# Patient Record
Sex: FEMALE | Race: WHITE | NOT HISPANIC OR LATINO | Employment: FULL TIME | URBAN - METROPOLITAN AREA
[De-identification: names, ages, dates, MRNs, and addresses within clinical notes are randomized per-mention and may not be internally consistent; named-entity substitution may affect disease eponyms.]

---

## 2017-11-02 ENCOUNTER — GENERIC CONVERSION - ENCOUNTER (OUTPATIENT)
Dept: FAMILY MEDICINE CLINIC | Facility: CLINIC | Age: 52
End: 2017-11-02

## 2017-11-02 ENCOUNTER — GENERIC CONVERSION - ENCOUNTER (OUTPATIENT)
Dept: OTHER | Facility: OTHER | Age: 52
End: 2017-11-02

## 2017-11-02 DIAGNOSIS — Z12.31 ENCOUNTER FOR SCREENING MAMMOGRAM FOR MALIGNANT NEOPLASM OF BREAST: ICD-10-CM

## 2017-11-02 DIAGNOSIS — E03.9 HYPOTHYROIDISM: ICD-10-CM

## 2017-11-02 DIAGNOSIS — R03.0 ELEVATED BLOOD PRESSURE READING WITHOUT DIAGNOSIS OF HYPERTENSION: ICD-10-CM

## 2018-01-22 VITALS
OXYGEN SATURATION: 99 % | TEMPERATURE: 97.7 F | WEIGHT: 253.5 LBS | HEIGHT: 68 IN | DIASTOLIC BLOOD PRESSURE: 90 MMHG | HEART RATE: 82 BPM | BODY MASS INDEX: 38.42 KG/M2 | SYSTOLIC BLOOD PRESSURE: 138 MMHG

## 2018-01-22 VITALS — SYSTOLIC BLOOD PRESSURE: 118 MMHG | DIASTOLIC BLOOD PRESSURE: 88 MMHG

## 2018-04-19 DIAGNOSIS — E03.9 HYPOTHYROIDISM, UNSPECIFIED TYPE: Primary | ICD-10-CM

## 2018-04-19 RX ORDER — LEVOTHYROXINE SODIUM 0.1 MG/1
TABLET ORAL
Qty: 90 TABLET | Refills: 1 | Status: SHIPPED | OUTPATIENT
Start: 2018-04-19 | End: 2018-10-16 | Stop reason: SDUPTHER

## 2018-04-21 LAB
ALBUMIN SERPL BCP-MCNC: 4.2 G/DL (ref 3.5–5.7)
ALP SERPL-CCNC: 44 IU/L (ref 40–150)
ALT SERPL W P-5'-P-CCNC: 22 IU/L (ref 0–50)
ANION GAP SERPL CALCULATED.3IONS-SCNC: 8.9 MM/L
AST SERPL W P-5'-P-CCNC: 18 U/L (ref 7–26)
BILIRUB SERPL-MCNC: 0.7 MG/DL (ref 0.3–1)
BUN SERPL-MCNC: 13 MG/DL (ref 7–25)
CALCIUM SERPL-MCNC: 9.2 MG/DL (ref 8.6–10.5)
CHLORIDE SERPL-SCNC: 102 MM/L (ref 98–107)
CO2 SERPL-SCNC: 30 MM/L (ref 21–31)
CREAT SERPL-MCNC: 0.8 MG/DL (ref 0.6–1.2)
EGFR (HISTORICAL): > 60 GFR
EGFR AFRICAN AMERICAN (HISTORICAL): > 60 GFR
GLUCOSE (HISTORICAL): 94 MG/DL (ref 65–99)
OSMOLALITY, SERUM (HISTORICAL): 274 MOSM (ref 262–291)
POTASSIUM SERPL-SCNC: 3.9 MM/L (ref 3.5–5.5)
SODIUM SERPL-SCNC: 137 MM/L (ref 134–143)
TOTAL PROTEIN (HISTORICAL): 6.8 G/DL (ref 6.4–8.9)
TSH SERPL DL<=0.05 MIU/L-ACNC: 2.93 UIU/M (ref 0.45–5.33)

## 2018-05-21 RX ORDER — ACETAMINOPHEN 160 MG
1 TABLET,DISINTEGRATING ORAL DAILY
COMMUNITY
Start: 2017-11-02

## 2018-06-15 ENCOUNTER — OFFICE VISIT (OUTPATIENT)
Dept: FAMILY MEDICINE CLINIC | Facility: CLINIC | Age: 53
End: 2018-06-15
Payer: COMMERCIAL

## 2018-06-15 VITALS
TEMPERATURE: 98.6 F | DIASTOLIC BLOOD PRESSURE: 80 MMHG | RESPIRATION RATE: 16 BRPM | HEIGHT: 68 IN | SYSTOLIC BLOOD PRESSURE: 118 MMHG | WEIGHT: 259 LBS | BODY MASS INDEX: 39.25 KG/M2 | HEART RATE: 75 BPM | OXYGEN SATURATION: 98 %

## 2018-06-15 DIAGNOSIS — Z12.11 SCREEN FOR COLON CANCER: Primary | ICD-10-CM

## 2018-06-15 DIAGNOSIS — E03.9 HYPOTHYROIDISM, UNSPECIFIED TYPE: ICD-10-CM

## 2018-06-15 DIAGNOSIS — Z12.39 SCREENING FOR BREAST CANCER: ICD-10-CM

## 2018-06-15 PROBLEM — R03.0 BLOOD PRESSURE ELEVATED WITHOUT HISTORY OF HTN: Status: ACTIVE | Noted: 2017-11-02

## 2018-06-15 PROBLEM — E55.9 VITAMIN D DEFICIENCY: Status: ACTIVE | Noted: 2017-11-02

## 2018-06-15 PROCEDURE — 3008F BODY MASS INDEX DOCD: CPT | Performed by: PHYSICIAN ASSISTANT

## 2018-06-15 PROCEDURE — 99214 OFFICE O/P EST MOD 30 MIN: CPT | Performed by: PHYSICIAN ASSISTANT

## 2018-06-15 RX ORDER — DIPHENOXYLATE HYDROCHLORIDE AND ATROPINE SULFATE 2.5; .025 MG/1; MG/1
1 TABLET ORAL DAILY
COMMUNITY

## 2018-06-15 NOTE — PROGRESS NOTES
Routine Follow-up    Harolyn Schilder 46 y o  female   Date:  6/15/2018      Assessment and Plan:    Heena Jaramillo was seen today for follow-up  Diagnoses and all orders for this visit:    Screen for colon cancer  - patient wishes to go where her  will go, does not need referral per patient     Screening for breast cancer  -     Mammo screening bilateral w cad; Future    Hypothyroidism, unspecified type  -     TSH, 3rd generation; Future  -     T4, free; Future  - defers endo referral  - repeat every 6 month thyroid labs  - sensitivity to cold - thyroid labs normal, no change in dose, can happen with weight loss      HPI:  Chief Complaint   Patient presents with    Follow-up     HPI  Patient is a 47 yo female with PMH below who presents for routine 6 month follow up  Her thyroid labs were stable in April and she continues synthroid daily, taking correctly  However, she states that her sensitivity to cold is getting worse  She has lost 5-10 lbs  She feels her skin is dry so uses serum on her face which helps  She was asking for recommendations  She did not schedule mammogram or colonoscopy from last visit  She does not want a colonoscopy script, will go where her  will go and say she does not need a referral  She did not establish with a new GYN  Her BP is normal this visit  She does drink daily coffee, 1 travel mug  She has no other concerns  ROS: Review of Systems   Constitutional: Negative for chills, fatigue, fever and unexpected weight change  HENT: Negative  Respiratory: Negative  Cardiovascular: Negative  Gastrointestinal: Negative  Endocrine: Positive for cold intolerance  Negative for heat intolerance  Genitourinary: Negative for difficulty urinating, dysuria and hematuria  Skin: Negative for color change, rash and wound  Dry skin    Neurological: Negative for dizziness, seizures, syncope, weakness, light-headedness and headaches  Hematological: Negative for adenopathy  Does not bruise/bleed easily  Psychiatric/Behavioral: Negative for confusion and sleep disturbance  The patient is not nervous/anxious  Past Medical History:   Diagnosis Date    Thyroid disorder      Patient Active Problem List   Diagnosis    Asthma    Blood pressure elevated without history of HTN    Epilepsy ([facility] [address]    Hyperlipidemia    Hypothyroidism    Vitamin D deficiency    Vertigo       Past Surgical History:   Procedure Laterality Date     SECTION         Social History     Social History    Marital status: /Civil Union     Spouse name: N/A    Number of children: N/A    Years of education: N/A     Social History Main Topics    Smoking status: Never Smoker    Smokeless tobacco: Never Used    Alcohol use No    Drug use: No    Sexual activity: Not Asked     Other Topics Concern    None     Social History Narrative    Always uses seat belt    Caffeine use, 2 cups of coffee per day           Family History   Problem Relation Age of Onset    Stroke Mother         cerebrovascular accident (CVA)    Other Mother         Epilepsy    Hypertension Father     Basal cell carcinoma Father     Heart attack Father         myocardial infarction    Other Other         epilepsy    Other Other         epilepsy       Allergies   Allergen Reactions    Monosodium Glutamate          Current Outpatient Prescriptions:     Cholecalciferol (VITAMIN D3) 2000 units capsule, Take 1 tablet by mouth daily, Disp: , Rfl:     levothyroxine 100 mcg tablet, TAKE 1 TABLET DAILY, Disp: 90 tablet, Rfl: 1    multivitamin (THERAGRAN) TABS, Take 1 tablet by mouth daily, Disp: , Rfl:       Physical Exam:  /80 (BP Location: Left arm, Patient Position: Sitting, Cuff Size: Adult)   Pulse 75   Temp 98 6 °F (37 °C)   Resp 16   Ht 5' 8" (1 727 m)   Wt 117 kg (259 lb)   SpO2 98%   BMI 39 38 kg/m²     Physical Exam   Constitutional: She is oriented to person, place, and time   She appears well-developed and well-nourished  No distress  Overweight     HENT:   Head: Normocephalic and atraumatic  Right Ear: External ear normal    Left Ear: External ear normal    Nose: Nose normal    Mouth/Throat: Oropharynx is clear and moist  No oropharyngeal exudate  Eyes: Conjunctivae are normal  Pupils are equal, round, and reactive to light  Wears glasses   Neck: Normal range of motion  Neck supple  No thyromegaly present  Cardiovascular: Normal rate, regular rhythm, normal heart sounds and intact distal pulses  Pulmonary/Chest: Effort normal and breath sounds normal  No respiratory distress  Musculoskeletal: Normal range of motion  She exhibits no deformity  Lymphadenopathy:     She has no cervical adenopathy  Neurological: She is alert and oriented to person, place, and time  No cranial nerve deficit  Skin: Skin is warm and dry  No rash noted  No erythema  No pallor  Skin soft on face (uses daily serums)      Psychiatric: She has a normal mood and affect   Her behavior is normal          Labs:  No results found for: WBC, HGB, HCT, MCV, PLT  No results found for: NA, K, CL, CO2, ANIONGAP, BUN, CREATININE, GLUCOSE, GLUF, CALCIUM, CORRECTEDCA, AST, ALT, ALKPHOS, PROT, ALBUMIN, BILITOT, EGFR

## 2018-06-29 ENCOUNTER — TELEPHONE (OUTPATIENT)
Dept: FAMILY MEDICINE CLINIC | Facility: CLINIC | Age: 53
End: 2018-06-29

## 2018-06-29 NOTE — TELEPHONE ENCOUNTER
Pt called and lm on vm stating she is unsure if the new script went over to her mail order when she was here in June  PT did not leave medication name  If nothing was sent please send to mail order

## 2018-06-29 NOTE — TELEPHONE ENCOUNTER
We would need name of medication  Her synthroid was sent for 6 month supply in April so should be covered until October

## 2018-10-15 ENCOUNTER — OFFICE VISIT (OUTPATIENT)
Dept: FAMILY MEDICINE CLINIC | Facility: CLINIC | Age: 53
End: 2018-10-15
Payer: COMMERCIAL

## 2018-10-15 VITALS
SYSTOLIC BLOOD PRESSURE: 140 MMHG | OXYGEN SATURATION: 98 % | HEART RATE: 98 BPM | TEMPERATURE: 98 F | DIASTOLIC BLOOD PRESSURE: 98 MMHG | WEIGHT: 260.2 LBS | HEIGHT: 70 IN | BODY MASS INDEX: 37.25 KG/M2

## 2018-10-15 DIAGNOSIS — J30.1 SEASONAL ALLERGIC RHINITIS DUE TO POLLEN: ICD-10-CM

## 2018-10-15 DIAGNOSIS — J45.21 MILD INTERMITTENT ASTHMA WITH ACUTE EXACERBATION: Primary | ICD-10-CM

## 2018-10-15 PROCEDURE — 3008F BODY MASS INDEX DOCD: CPT | Performed by: FAMILY MEDICINE

## 2018-10-15 PROCEDURE — 99213 OFFICE O/P EST LOW 20 MIN: CPT | Performed by: FAMILY MEDICINE

## 2018-10-15 PROCEDURE — 1036F TOBACCO NON-USER: CPT | Performed by: FAMILY MEDICINE

## 2018-10-15 RX ORDER — ALBUTEROL SULFATE 90 UG/1
2 AEROSOL, METERED RESPIRATORY (INHALATION) EVERY 6 HOURS PRN
Qty: 1 INHALER | Refills: 1 | Status: SHIPPED | OUTPATIENT
Start: 2018-10-15 | End: 2020-01-31 | Stop reason: SDUPTHER

## 2018-10-15 NOTE — PROGRESS NOTES
Assessment/Plan:       Diagnoses and all orders for this visit:    Mild intermittent asthma with acute exacerbation  -     albuterol (VENTOLIN HFA) 90 mcg/act inhaler; Inhale 2 puffs every 6 (six) hours as needed for wheezing    Seasonal allergic rhinitis due to pollen          Subjective:   Chief Complaint   Patient presents with    Asthma     asthma attack last night, due for colonoscopy has never had one        Patient ID: Jolanta Pierre is a 46 y o  female  Per c/c reviewed by me  C/o "Stars must have aligned and I had an asthma attack, haven't had one in years, years, didn't have any inhalers -haven't had one in years, Friday night went to bed tight, next day released a little, but last night wouldn't"  Tried allegra-d and breathing in steam  "Do have allergies, usually late august, and did feel better after got out of my house"  Works in a school, 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, no recent travel        The following portions of the patient's history were reviewed and updated as appropriate: allergies, current medications, past family history, past medical history, past social history, past surgical history and problem list     Review of Systems   Constitutional: Negative  HENT: Positive for postnasal drip, rhinorrhea and sneezing  Negative for congestion, facial swelling, hearing loss, mouth sores, nosebleeds, sinus pain, sinus pressure, sore throat and trouble swallowing  Eyes: Negative  Respiratory: Positive for cough, chest tightness, shortness of breath and wheezing  Negative for apnea, choking and stridor  Cardiovascular: Negative  Gastrointestinal: Negative  Skin: Negative  Neurological: Negative  Hematological: Negative            Objective:      /98 (BP Location: Left arm, Patient Position: Sitting, Cuff Size: Standard)   Pulse 98   Temp 98 °F (36 7 °C) (Tympanic)   Ht 5' 9 5" (1 765 m)   Wt 118 kg (260 lb 3 2 oz)   LMP 09/01/2018 (Approximate)   SpO2 98%   BMI 37 87 kg/m²          Physical Exam   Constitutional: She is oriented to person, place, and time  She appears well-developed  Non-toxic appearance  She does not have a sickly appearance  She does not appear ill  No distress  HENT:   Head: Normocephalic and atraumatic  Right Ear: Tympanic membrane, external ear and ear canal normal    Left Ear: Tympanic membrane, external ear and ear canal normal    Nose: Mucosal edema and rhinorrhea present  No sinus tenderness  Right sinus exhibits no maxillary sinus tenderness and no frontal sinus tenderness  Left sinus exhibits no maxillary sinus tenderness and no frontal sinus tenderness  Mouth/Throat: Uvula is midline, oropharynx is clear and moist and mucous membranes are normal    Eyes: Pupils are equal, round, and reactive to light  Conjunctivae and lids are normal    Cardiovascular: Normal rate, regular rhythm and normal heart sounds  Pulmonary/Chest: Effort normal  She has decreased breath sounds in the right lower field and the left lower field  She has wheezes  She has no rhonchi  She has no rales  Lymphadenopathy:     She has no cervical adenopathy  Right: No supraclavicular adenopathy present  Left: No supraclavicular adenopathy present  Neurological: She is alert and oriented to person, place, and time  Skin: Skin is warm and dry  She is not diaphoretic  No cyanosis  No pallor  Nails show no clubbing  Psychiatric: She has a normal mood and affect  Her behavior is normal    Nursing note and vitals reviewed

## 2018-10-16 DIAGNOSIS — E03.9 HYPOTHYROIDISM, UNSPECIFIED TYPE: ICD-10-CM

## 2018-10-16 RX ORDER — LEVOTHYROXINE SODIUM 0.1 MG/1
TABLET ORAL
Qty: 90 TABLET | Refills: 1 | Status: SHIPPED | OUTPATIENT
Start: 2018-10-16 | End: 2019-04-14 | Stop reason: SDUPTHER

## 2019-01-30 ENCOUNTER — OFFICE VISIT (OUTPATIENT)
Dept: FAMILY MEDICINE CLINIC | Facility: CLINIC | Age: 54
End: 2019-01-30
Payer: COMMERCIAL

## 2019-01-30 VITALS
DIASTOLIC BLOOD PRESSURE: 80 MMHG | SYSTOLIC BLOOD PRESSURE: 124 MMHG | WEIGHT: 262 LBS | BODY MASS INDEX: 38.14 KG/M2 | HEART RATE: 81 BPM | OXYGEN SATURATION: 97 % | TEMPERATURE: 98.1 F | RESPIRATION RATE: 16 BRPM

## 2019-01-30 DIAGNOSIS — Z12.39 BREAST CANCER SCREENING: ICD-10-CM

## 2019-01-30 DIAGNOSIS — Z01.419 ENCOUNTER FOR GYNECOLOGICAL EXAMINATION WITHOUT ABNORMAL FINDING: ICD-10-CM

## 2019-01-30 DIAGNOSIS — L72.3 SEBACEOUS CYST OF LEFT AXILLA: Primary | ICD-10-CM

## 2019-01-30 PROCEDURE — 99213 OFFICE O/P EST LOW 20 MIN: CPT | Performed by: PHYSICIAN ASSISTANT

## 2019-01-30 RX ORDER — SODIUM PICOSULFATE, MAGNESIUM OXIDE, AND ANHYDROUS CITRIC ACID 10; 3.5; 12 MG/160ML; G/160ML; G/160ML
LIQUID ORAL
Refills: 0 | COMMUNITY
Start: 2019-01-10 | End: 2019-07-09

## 2019-01-30 NOTE — PROGRESS NOTES
Routine Follow-up     48 y o  female   Date:  2019      Assessment and Plan:    Jeri Frazier was seen today for mass and follow-up  Diagnoses and all orders for this visit:    Sebaceous cyst of left axilla  -     US extremity soft tissue; Future  -     Ambulatory referral to General Surgery; Future    Encounter for gynecological examination without abnormal finding  -     Ambulatory referral to Gynecology; Future    Breast cancer screening  -     Mammo screening bilateral w cad; Future              HPI:  Chief Complaint   Patient presents with    Mass     under left arm- had for several year- had checked out at first- was told do not do anything till bothered- now bothering her    Follow-up     need awilda done- had rx- getting CRC in [place]      HPI  Patient is a 47 yo female who presents with concern of bothersome cyst of L axilla  She recalls being told she had a sebaceous cyst about 5 years ago and since was not bothering her at that time, no follow up  She reports feeling like it has grown and just feels like a bothersome golfball in axilla when moves arm  No cellulitis or drainage  She does not recall any imaging  She also need new script for mammogram  She wants to establish with a new GNY and will be going for colonoscopy next month  ROS: Review of Systems   Constitutional: Negative  Respiratory: Negative  Cardiovascular: Negative  Skin: Negative for color change, rash and wound  Lump L armpit     Neurological: Negative  Hematological: Negative          Past Medical History:   Diagnosis Date    Thyroid disorder      Patient Active Problem List   Diagnosis    Asthma    Blood pressure elevated without history of HTN    Epilepsy ([facility] [address]    Hyperlipidemia    Hypothyroidism    Vitamin D deficiency    Vertigo    Seasonal allergic rhinitis due to pollen       Past Surgical History:   Procedure Laterality Date     SECTION         Social History     Social History  Marital status: /Civil Union     Spouse name: N/A    Number of children: N/A    Years of education: N/A     Social History Main Topics    Smoking status: Never Smoker    Smokeless tobacco: Never Used    Alcohol use No    Drug use: No    Sexual activity: Not on file     Other Topics Concern    Not on file     Social History Narrative    Always uses seat belt    Caffeine use, 2 cups of coffee per day           Family History   Problem Relation Age of Onset    Stroke Mother         cerebrovascular accident (CVA)    Other Mother         Epilepsy    Hypertension Father     Basal cell carcinoma Father     Heart attack Father         myocardial infarction    Other Other         epilepsy    Other Other         epilepsy       Allergies   Allergen Reactions    Monosodium Glutamate          Current Outpatient Prescriptions:     albuterol (VENTOLIN HFA) 90 mcg/act inhaler, Inhale 2 puffs every 6 (six) hours as needed for wheezing, Disp: 1 Inhaler, Rfl: 1    Cholecalciferol (VITAMIN D3) 2000 units capsule, Take 1 tablet by mouth daily, Disp: , Rfl:     levothyroxine 100 mcg tablet, TAKE 1 TABLET DAILY, Disp: 90 tablet, Rfl: 1    multivitamin (THERAGRAN) TABS, Take 1 tablet by mouth daily, Disp: , Rfl:     CLENPIQ 10-3 5-12 MG-GM -GM/160ML SOLN, , Disp: , Rfl: 0      Physical Exam:  /80   Pulse 81   Temp 98 1 °F (36 7 °C)   Resp 16   Wt 119 kg (262 lb)   SpO2 97%   BMI 38 14 kg/m²     Physical Exam   Constitutional: She is oriented to person, place, and time  She appears well-developed and well-nourished  No distress  obese   Neurological: She is alert and oriented to person, place, and time  Skin: Skin is dry  No rash noted  Appreciable lump in L axila - cyst vs lymph node without any surrounding skin changes    Psychiatric: She has a normal mood and affect   Her behavior is normal          Labs:  No results found for: WBC, HGB, HCT, MCV, PLT  Lab Results   Component Value Date  04/21/2018    K 3 9 04/21/2018     04/21/2018    CO2 30 04/21/2018    ANIONGAP 8 9 04/21/2018    BUN 13 04/21/2018    CREATININE 0 80 04/21/2018    CALCIUM 9 2 04/21/2018    AST 18 04/21/2018    ALT 22 04/21/2018    ALKPHOS 44 04/21/2018    PROT 6 8 04/21/2018    BILITOT 0 7 04/21/2018

## 2019-02-06 ENCOUNTER — HOSPITAL ENCOUNTER (OUTPATIENT)
Dept: ULTRASOUND IMAGING | Facility: HOSPITAL | Age: 54
Discharge: HOME | End: 2019-02-06
Payer: COMMERCIAL

## 2019-02-06 DIAGNOSIS — L72.3 SEBACEOUS CYST OF LEFT AXILLA: ICD-10-CM

## 2019-02-06 PROCEDURE — 76882 US LMTD JT/FCL EVL NVASC XTR: CPT

## 2019-02-07 ENCOUNTER — OFFICE VISIT (OUTPATIENT)
Dept: SURGERY | Facility: CLINIC | Age: 54
End: 2019-02-07
Payer: COMMERCIAL

## 2019-02-07 VITALS
WEIGHT: 262 LBS | TEMPERATURE: 97.7 F | SYSTOLIC BLOOD PRESSURE: 122 MMHG | DIASTOLIC BLOOD PRESSURE: 76 MMHG | HEIGHT: 70 IN | BODY MASS INDEX: 37.51 KG/M2 | HEART RATE: 70 BPM

## 2019-02-07 DIAGNOSIS — L72.3 SEBACEOUS CYST OF LEFT AXILLA: ICD-10-CM

## 2019-02-07 PROCEDURE — 99243 OFF/OP CNSLTJ NEW/EST LOW 30: CPT | Performed by: PHYSICIAN ASSISTANT

## 2019-02-07 NOTE — PROGRESS NOTES
Assessment/Plan:   Humberto White is a 48 y  o female who is here for The encounter diagnosis was Sebaceous cyst of left axilla  Patient states she has had a mass of left axilla for years that would wax and wane in size and tenderness  She was told it was a sebaceous cyst   Patient had an axillary US that showed no abnormality at area of concern    On exam found to have axillary fullness , no discrete mass noted, no skin changes  Minor tenderness of axilla        Plan: Continue to observe with recommendation for follow-up if area changes           _______________________________________________________  CC:Consult (Left axilla- suspected chuck cyst)    HPI:  Humberto White is a 48 y  o female who was referred for evaluation of Consult (Left axilla- suspected seb cyst)    Currently patient reports painful mass of left axilla  Patient states she has had a mass of left axilla for years that would wax and wane in size and tenderness  She was told it was a sebaceous cyst   Patient had an axillary US that showed no abnormality at area of concern  Reports: growing and painful    ROS:  General ROS: negative  negative for - chills, fatigue, fever or night sweats, weight loss  Respiratory ROS: no cough, shortness of breath, or wheezing  Cardiovascular ROS: no chest pain or dyspnea on exertion  Genito-Urinary ROS: no dysuria, trouble voiding, or hematuria  Musculoskeletal ROS: negative for - gait disturbance, joint pain or muscle pain  Neurological ROS: no TIA or stroke symptoms  Skin ROS: See HPI  GI ROS: see HPI  Skin ROS: no new rashes or lesions   Lymphatic ROS: no new adenopathy noted by pt     GYN ROS: see HPI, no new GYN history or bleeding noted  Psy ROS: no new mental or behavioral disturbances       Patient Active Problem List   Diagnosis    Asthma    Blood pressure elevated without history of HTN    Epilepsy ([facility] [address]    Hyperlipidemia    Hypothyroidism    Vitamin D deficiency    Vertigo    Seasonal allergic rhinitis due to pollen         Allergies:  Monosodium glutamate      Current Outpatient Prescriptions:     albuterol (VENTOLIN HFA) 90 mcg/act inhaler, Inhale 2 puffs every 6 (six) hours as needed for wheezing, Disp: 1 Inhaler, Rfl: 1    Cholecalciferol (VITAMIN D3) 2000 units capsule, Take 1 tablet by mouth daily, Disp: , Rfl:     CLENPIQ 10-3 5-12 MG-GM -GM/160ML SOLN, , Disp: , Rfl: 0    levothyroxine 100 mcg tablet, TAKE 1 TABLET DAILY, Disp: 90 tablet, Rfl: 1    multivitamin (THERAGRAN) TABS, Take 1 tablet by mouth daily, Disp: , Rfl:     Past Medical History:   Diagnosis Date    Thyroid disorder        Past Surgical History:   Procedure Laterality Date     SECTION         Family History   Problem Relation Age of Onset    Stroke Mother         cerebrovascular accident (CVA)    Other Mother         Epilepsy    Hypertension Father     Basal cell carcinoma Father     Heart attack Father         myocardial infarction    Other Other         epilepsy    Other Other         epilepsy        reports that she has never smoked  She has never used smokeless tobacco  She reports that she does not drink alcohol or use drugs  PHYSICAL EXAM  General Appearance:    Alert, cooperative, no distress   Head:    Normocephalic without obvious abnormality   Eyes:    PERRL, conjunctiva/corneas clear, EOM's intact        Neck:   Supple, no adenopathy, no JVD   Back:     Symmetric, no spinal or CVA tenderness   Lungs:     Clear to auscultation bilaterally, no wheezing or rhonchi   Heart:    Regular rate and rhythm, S1 and S2 normal, no murmur   Abdomen:     Benign, no rebound or guarding  Extremities:   Extremities normal  No clubbing, cyanosis or edema   Psych:   Normal Affect, AOx3  Neurologic:  Skin:   CNII-XII intact   Strength symmetric, speech intact    Warm, dry, intact, no visible rashes or lesions except as follows: area of tenderness but no skin changes , no discrete mass palpable Some portions of this record may have been generated with voice recognition software  There may be translation, syntax,  or grammatical errors  Occasional wrong word or "sound-a-like" substitutions may have occurred due to the inherent limitations of the voice recognition software  Read the chart carefully and recognize, using context, where substitutions may have occurred  If you have any questions, please contact the dictating provider for clarification or correction, as needed  This encounter has been coded by a non-certified coder         Mare Brittle, MD    Date: 2/7/2019 Time: 3:49 PM

## 2019-02-07 NOTE — LETTER
February 7, 2019     Jacquelyn Castanon PA-C  [address]  [facility]  [phone] [id number]    Patient: Heather Oliveros   YOB: 1965   Date of Visit: 2/7/2019       Dear Dr Dorrie Gilford: Thank you for referring Ignacio Faye to me for evaluation  Below are my notes for this consultation  If you have questions, please do not hesitate to call me  I look forward to following your patient along with you  Sincerely,        Tonia Magallon MD        CC: No Recipients  Angel Pablo  2/7/2019  3:53 PM  Sign at close encounter  Assessment/Plan:   Heather Oliveros is a 48 y  o female who is here for The encounter diagnosis was Sebaceous cyst of left axilla  Patient states she has had a mass of left axilla for years that would wax and wane in size and tenderness  She was told it was a sebaceous cyst   Patient had an axillary US that showed no abnormality at area of concern    On exam found to have axillary fullness , no discrete mass noted, no skin changes  Minor tenderness of axilla        Plan: Continue to observe with recommendation for follow-up if area changes           _______________________________________________________  CC:Consult (Left axilla- suspected seb cyst)    HPI:  Heather Oliveros is a 48 y  o female who was referred for evaluation of Consult (Left axilla- suspected seb cyst)    Currently patient reports painful mass of left axilla  Patient states she has had a mass of left axilla for years that would wax and wane in size and tenderness   She was told it was a sebaceous cyst   Patient had an axillary US that showed no abnormality at area of concern  Reports: growing and painful    ROS:  General ROS: negative  negative for - chills, fatigue, fever or night sweats, weight loss  Respiratory ROS: no cough, shortness of breath, or wheezing  Cardiovascular ROS: no chest pain or dyspnea on exertion  Genito-Urinary ROS: no dysuria, trouble voiding, or hematuria  Musculoskeletal ROS: negative for - gait disturbance, joint pain or muscle pain  Neurological ROS: no TIA or stroke symptoms  Skin ROS: See HPI  GI ROS: see HPI  Skin ROS: no new rashes or lesions   Lymphatic ROS: no new adenopathy noted by pt  GYN ROS: see HPI, no new GYN history or bleeding noted  Psy ROS: no new mental or behavioral disturbances       Patient Active Problem List   Diagnosis    Asthma    Blood pressure elevated without history of HTN    Epilepsy ([facility] [address]    Hyperlipidemia    Hypothyroidism    Vitamin D deficiency    Vertigo    Seasonal allergic rhinitis due to pollen         Allergies:  Monosodium glutamate      Current Outpatient Prescriptions:     albuterol (VENTOLIN HFA) 90 mcg/act inhaler, Inhale 2 puffs every 6 (six) hours as needed for wheezing, Disp: 1 Inhaler, Rfl: 1    Cholecalciferol (VITAMIN D3) 2000 units capsule, Take 1 tablet by mouth daily, Disp: , Rfl:     CLENPIQ 10-3 5-12 MG-GM -GM/160ML SOLN, , Disp: , Rfl: 0    levothyroxine 100 mcg tablet, TAKE 1 TABLET DAILY, Disp: 90 tablet, Rfl: 1    multivitamin (THERAGRAN) TABS, Take 1 tablet by mouth daily, Disp: , Rfl:     Past Medical History:   Diagnosis Date    Thyroid disorder        Past Surgical History:   Procedure Laterality Date     SECTION         Family History   Problem Relation Age of Onset    Stroke Mother         cerebrovascular accident (CVA)    Other Mother         Epilepsy    Hypertension Father     Basal cell carcinoma Father     Heart attack Father         myocardial infarction    Other Other         epilepsy    Other Other         epilepsy        reports that she has never smoked  She has never used smokeless tobacco  She reports that she does not drink alcohol or use drugs      PHYSICAL EXAM  General Appearance:    Alert, cooperative, no distress   Head:    Normocephalic without obvious abnormality   Eyes:    PERRL, conjunctiva/corneas clear, EOM's intact        Neck: Supple, no adenopathy, no JVD   Back:     Symmetric, no spinal or CVA tenderness   Lungs:     Clear to auscultation bilaterally, no wheezing or rhonchi   Heart:    Regular rate and rhythm, S1 and S2 normal, no murmur   Abdomen:     Benign, no rebound or guarding  Extremities:   Extremities normal  No clubbing, cyanosis or edema   Psych:   Normal Affect, AOx3  Neurologic:  Skin:   CNII-XII intact  Strength symmetric, speech intact    Warm, dry, intact, no visible rashes or lesions except as follows: area of tenderness but no skin changes , no discrete mass palpable               Some portions of this record may have been generated with voice recognition software  There may be translation, syntax,  or grammatical errors  Occasional wrong word or "sound-a-like" substitutions may have occurred due to the inherent limitations of the voice recognition software  Read the chart carefully and recognize, using context, where substitutions may have occurred  If you have any questions, please contact the dictating provider for clarification or correction, as needed  This encounter has been coded by a non-certified coder         Estefanía Deleon MD    Date: 2/7/2019 Time: 3:49 PM

## 2019-03-02 ENCOUNTER — HOSPITAL ENCOUNTER (OUTPATIENT)
Dept: MAMMOGRAPHY | Facility: HOSPITAL | Age: 54
Discharge: HOME | End: 2019-03-02
Payer: COMMERCIAL

## 2019-03-02 DIAGNOSIS — Z12.39 BREAST CANCER SCREENING: ICD-10-CM

## 2019-03-02 PROCEDURE — 77067 SCR MAMMO BI INCL CAD: CPT

## 2019-03-02 PROCEDURE — 77063 BREAST TOMOSYNTHESIS BI: CPT

## 2019-04-14 DIAGNOSIS — E03.9 HYPOTHYROIDISM, UNSPECIFIED TYPE: ICD-10-CM

## 2019-04-14 RX ORDER — LEVOTHYROXINE SODIUM 0.1 MG/1
TABLET ORAL
Qty: 90 TABLET | Refills: 0 | Status: SHIPPED | OUTPATIENT
Start: 2019-04-14 | End: 2019-07-09 | Stop reason: SDUPTHER

## 2019-06-28 ENCOUNTER — TELEPHONE (OUTPATIENT)
Dept: FAMILY MEDICINE CLINIC | Facility: CLINIC | Age: 54
End: 2019-06-28

## 2019-07-05 ENCOUNTER — TELEPHONE (OUTPATIENT)
Dept: FAMILY MEDICINE CLINIC | Facility: CLINIC | Age: 54
End: 2019-07-05

## 2019-07-05 NOTE — TELEPHONE ENCOUNTER
Left message for patient that she missed her appointment today 07/05/2019   I made her aware that this is her second no show and to call back to reschedule

## 2019-07-09 ENCOUNTER — OFFICE VISIT (OUTPATIENT)
Dept: FAMILY MEDICINE CLINIC | Facility: CLINIC | Age: 54
End: 2019-07-09
Payer: COMMERCIAL

## 2019-07-09 VITALS
OXYGEN SATURATION: 97 % | WEIGHT: 262 LBS | RESPIRATION RATE: 17 BRPM | BODY MASS INDEX: 38.8 KG/M2 | TEMPERATURE: 97.7 F | HEIGHT: 69 IN | DIASTOLIC BLOOD PRESSURE: 84 MMHG | SYSTOLIC BLOOD PRESSURE: 130 MMHG | HEART RATE: 81 BPM

## 2019-07-09 DIAGNOSIS — E55.9 VITAMIN D DEFICIENCY: Primary | ICD-10-CM

## 2019-07-09 DIAGNOSIS — E03.9 HYPOTHYROIDISM, UNSPECIFIED TYPE: ICD-10-CM

## 2019-07-09 DIAGNOSIS — Z13.220 LIPID SCREENING: ICD-10-CM

## 2019-07-09 PROCEDURE — 1036F TOBACCO NON-USER: CPT | Performed by: PHYSICIAN ASSISTANT

## 2019-07-09 PROCEDURE — 99214 OFFICE O/P EST MOD 30 MIN: CPT | Performed by: PHYSICIAN ASSISTANT

## 2019-07-09 PROCEDURE — 3008F BODY MASS INDEX DOCD: CPT | Performed by: PHYSICIAN ASSISTANT

## 2019-07-09 RX ORDER — LEVOTHYROXINE SODIUM 0.1 MG/1
100 TABLET ORAL DAILY
Qty: 90 TABLET | Refills: 1 | Status: SHIPPED | OUTPATIENT
Start: 2019-07-09 | End: 2020-01-09

## 2019-07-09 NOTE — PROGRESS NOTES
Routine Follow-up    Alfredito Lockett 48 y o  female   Date:  7/9/2019      Assessment and Plan:    Corina Pinzon was seen today for follow-up  Diagnoses and all orders for this visit:    Vitamin D deficiency  -     Vitamin D 25 hydroxy; Future    Hypothyroidism, unspecified type  -     levothyroxine 100 mcg tablet; Take 1 tablet (100 mcg total) by mouth daily  -     TSH, 3rd generation; Future  -     T4, free; Future    BMI 38 0-38 9,adult  -     Comprehensive metabolic panel; Future  -     Hemoglobin A1C; Future    Lipid screening  -     Lipid panel; Future      Encourage GYN f/u        HPI:  Chief Complaint   Patient presents with    Follow-up     Colonoscopy completed Feb 2019 by [facility]      HPI   Patient is a 49 yo female who presents for routine follow up  She is overdue for routine/thyroid labs  She takes prn zyrtec for allergies  Her asthma has been controlled, no exacerbations  She uses albuterol prn  She went to [facility] for colonoscopy - 2 polyps removed, return in 5 years - feb 2024  She had mammogram done in march  She is overdue for GYN care  She is UTD on dental exams and eye exams at [facility]  She lacks regular exercise  She is thinking about starting program with [facility]  She is unsure of tetanus booster within last 10 years  ROS: Review of Systems   Constitutional: Negative  Eyes: Positive for itching (taking prn zyrtec)  Visual disturbance: wearing glasses  Respiratory: Negative  Cardiovascular: Negative  Gastrointestinal: Negative  Genitourinary: Negative  Musculoskeletal: Negative  Skin: Negative  Allergic/Immunologic: Positive for environmental allergies  Neurological: Negative  Psychiatric/Behavioral: Negative          Past Medical History:   Diagnosis Date    Thyroid disorder      Patient Active Problem List   Diagnosis    Asthma    Blood pressure elevated without history of HTN    Hyperlipidemia    Hypothyroidism    Vitamin D deficiency    Vertigo    Seasonal allergic rhinitis due to pollen       Past Surgical History:   Procedure Laterality Date     SECTION         Social History     Socioeconomic History    Marital status: /Civil Union     Spouse name: None    Number of children: None    Years of education: None    Highest education level: None   Occupational History    None   Social Needs    Financial resource strain: None    Food insecurity:     Worry: None     Inability: None    Transportation needs:     Medical: None     Non-medical: None   Tobacco Use    Smoking status: Never Smoker    Smokeless tobacco: Never Used   Substance and Sexual Activity    Alcohol use: No    Drug use: No    Sexual activity: None   Lifestyle    Physical activity:     Days per week: None     Minutes per session: None    Stress: None   Relationships    Social connections:     Talks on phone: None     Gets together: None     Attends Alevism service: None     Active member of club or organization: None     Attends meetings of clubs or organizations: None     Relationship status: None    Intimate partner violence:     Fear of current or ex partner: None     Emotionally abused: None     Physically abused: None     Forced sexual activity: None   Other Topics Concern    None   Social History Narrative    Always uses seat belt    Caffeine use, 2 cups of coffee per day       Family History   Problem Relation Age of Onset    Stroke Mother         cerebrovascular accident (CVA)    Other Mother         Epilepsy    Hypertension Father     Basal cell carcinoma Father     Heart attack Father         myocardial infarction    Other Other         epilepsy    Other Other         epilepsy       Allergies   Allergen Reactions    Monosodium Glutamate Hives and Itching         Current Outpatient Medications:     albuterol (VENTOLIN HFA) 90 mcg/act inhaler, Inhale 2 puffs every 6 (six) hours as needed for wheezing, Disp: 1 Inhaler, Rfl: 1    Cholecalciferol (VITAMIN D3) 2000 units capsule, Take 1 tablet by mouth daily, Disp: , Rfl:     levothyroxine 100 mcg tablet, Take 1 tablet (100 mcg total) by mouth daily, Disp: 90 tablet, Rfl: 1    multivitamin (THERAGRAN) TABS, Take 1 tablet by mouth daily, Disp: , Rfl:       Physical Exam:  /84 (BP Location: Left arm, Patient Position: Sitting, Cuff Size: Large)   Pulse 81   Temp 97 7 °F (36 5 °C) (Tympanic)   Resp 17   Ht 5' 9" (1 753 m)   Wt 119 kg (262 lb)   SpO2 97%   BMI 38 69 kg/m²     Physical Exam   Constitutional: She is oriented to person, place, and time  Vital signs are normal  She appears well-developed and well-nourished  No distress  HENT:   Head: Normocephalic and atraumatic  Right Ear: Tympanic membrane, external ear and ear canal normal    Left Ear: Tympanic membrane, external ear and ear canal normal    Nose: Nose normal    Mouth/Throat: Oropharynx is clear and moist    Eyes: Pupils are equal, round, and reactive to light  Conjunctivae and lids are normal    Neck: Trachea normal and normal range of motion  Neck supple  No thyromegaly present  Cardiovascular: Normal rate, regular rhythm, S1 normal, S2 normal and intact distal pulses  Exam reveals no gallop  No murmur heard  Pulmonary/Chest: Breath sounds normal  No respiratory distress  She has no wheezes  She has no rhonchi  She has no rales  Abdominal: Soft  Normal appearance and bowel sounds are normal  She exhibits no mass  There is no hepatosplenomegaly  There is no tenderness  Musculoskeletal: Normal range of motion  She exhibits no edema or deformity  Lymphadenopathy:     She has no cervical adenopathy  Neurological: She is alert and oriented to person, place, and time  She has normal reflexes  No cranial nerve deficit or sensory deficit  Skin: Skin is warm and dry  No rash noted  No cyanosis  No pallor  Nails show no clubbing  Psychiatric: She has a normal mood and affect   Her behavior is normal  Cognition and memory are normal          Labs:  No results found for: WBC, HGB, HCT, MCV, PLT  Lab Results   Component Value Date     04/21/2018    K 3 9 04/21/2018     04/21/2018    CO2 30 04/21/2018    ANIONGAP 8 9 04/21/2018    BUN 13 04/21/2018    CREATININE 0 80 04/21/2018    CALCIUM 9 2 04/21/2018    AST 18 04/21/2018    ALT 22 04/21/2018    ALKPHOS 44 04/21/2018    PROT 6 8 04/21/2018    BILITOT 0 7 04/21/2018               BMI Counseling: Body mass index is 38 69 kg/m²  Discussed the patient's BMI with her  The BMI is above average  BMI counseling and education was provided to the patient  Nutrition recommendations include reducing portion sizes and decreasing overall calorie intake  Exercise recommendations include exercising 3-5 times per week

## 2019-07-09 NOTE — PATIENT INSTRUCTIONS
Please inquire about shingles and tetanus booster coverage through insurance  Obesity   AMBULATORY CARE:   Obesity  is when your body mass index (BMI) is greater than 30  Your healthcare provider will use your height and weight to measure your BMI  The risks of obesity include  many health problems, such as injuries or physical disability  You may need tests to check for the following:  · Diabetes     · High blood pressure or high cholesterol     · Heart disease     · Gallbladder or liver disease     · Cancer of the colon, breast, prostate, liver, or kidney     · Sleep apnea     · Arthritis or gout  Seek care immediately if:   · You have a severe headache, confusion, or difficulty speaking  · You have weakness on one side of your body  · You have chest pain, sweating, or shortness of breath  Contact your healthcare provider if:   · You have symptoms of gallbladder or liver disease, such as pain in your upper abdomen  · You have knee or hip pain and discomfort while walking  · You have symptoms of diabetes, such as intense hunger and thirst, and frequent urination  · You have symptoms of sleep apnea, such as snoring or daytime sleepiness  · You have questions or concerns about your condition or care  Treatment for obesity  focuses on helping you lose weight to improve your health  Even a small decrease in BMI can reduce the risk for many health problems  Your healthcare provider will help you set a weight-loss goal   · Lifestyle changes  are the first step in treating obesity  These include making healthy food choices and getting regular physical activity  Your healthcare provider may suggest a weight-loss program that involves coaching, education, and therapy  · Medicine  may help you lose weight when it is used with a healthy diet and physical activity  · Surgery  can help you lose weight if you are very obese and have other health problems   There are several types of weight-loss surgery  Ask your healthcare provider for more information  Be successful losing weight:   · Set small, realistic goals  An example of a small goal is to walk for 20 minutes 5 days a week  Anther goal is to lose 5% of your body weight  · Tell friends, family members, and coworkers about your goals  and ask for their support  Ask a friend to lose weight with you, or join a weight-loss support group  · Identify foods or triggers that may cause you to overeat , and find ways to avoid them  Remove tempting high-calorie foods from your home and workplace  Place a bowl of fresh fruit on your kitchen counter  If stress causes you to eat, then find other ways to cope with stress  · Keep a diary to track what you eat and drink  Also write down how many minutes of physical activity you do each day  Weigh yourself once a week and record it in your diary  Eating changes: You will need to eat 500 to 1,000 fewer calories each day than you currently eat to lose 1 to 2 pounds a week  The following changes will help you cut calories:  · Eat smaller portions  Use small plates, no larger than 9 inches in diameter  Fill your plate half full of fruits and vegetables  Measure your food using measuring cups until you know what a serving size looks like  · Eat 3 meals and 1 or 2 snacks each day  Plan your meals in advance  Marcia Eaton and eat at home most of the time  Eat slowly  · Eat fruits and vegetables at every meal   They are low in calories and high in fiber, which makes you feel full  Do not add butter, margarine, or cream sauce to vegetables  Use herbs to season steamed vegetables  · Eat less fat and fewer fried foods  Eat more baked or grilled chicken and fish  These protein sources are lower in calories and fat than red meat  Limit fast food  Dress your salads with olive oil and vinegar instead of bottled dressing  · Limit the amount of sugar you eat  Do not drink sugary beverages   Limit alcohol  Activity changes:  Physical activity is good for your body in many ways  It helps you burn calories and build strong muscles  It decreases stress and depression, and improves your mood  It can also help you sleep better  Talk to your healthcare provider before you begin an exercise program   · Exercise for at least 30 minutes 5 days a week  Start slowly  Set aside time each day for physical activity that you enjoy and that is convenient for you  It is best to do both weight training and an activity that increases your heart rate, such as walking, bicycling, or swimming  · Find ways to be more active  Do yard work and housecleaning  Walk up the stairs instead of using elevators  Spend your leisure time going to events that require walking, such as outdoor festivals or fairs  This extra physical activity can help you lose weight and keep it off  Follow up with your healthcare provider as directed: You may need to meet with a dietitian  Write down your questions so you remember to ask them during your visits  © 2017 [address] Information is for End User's use only and may not be sold, redistributed or otherwise used for commercial purposes  All illustrations and images included in CareNotes® are the copyrighted property of [employer] M [employer]  or [employer]  The above information is an  only  It is not intended as medical advice for individual conditions or treatments  Talk to your doctor, nurse or pharmacist before following any medical regimen to see if it is safe and effective for you  Weight Management   AMBULATORY CARE:   Why it is important to manage your weight:  Being overweight increases your risk of health conditions such as heart disease, high blood pressure, type 2 diabetes, and certain types of cancer  It can also increase your risk for osteoarthritis, sleep apnea, and other respiratory problems  Aim for a slow, steady weight loss   Even a small amount of weight loss can lower your risk of health problems  How to lose weight safely:  A safe and healthy way to lose weight is to eat fewer calories and get regular exercise  You can lose up about 1 pound a week by decreasing the number of calories you eat by 500 calories each day  You can decrease calories by eating smaller portion sizes or by cutting out high-calorie foods  Read labels to find out how many calories are in the foods you eat  You can also burn calories with exercise such as walking, swimming, or biking  You will be more likely to keep weight off if you make these changes part of your lifestyle  Healthy meal plan for weight management:  A healthy meal plan includes a variety of foods, contains fewer calories, and helps you stay healthy  A healthy meal plan includes the following:  · Eat whole-grain foods more often  A healthy meal plan should contain fiber  Fiber is the part of grains, fruits, and vegetables that is not broken down by your body  Whole-grain foods are healthy and provide extra fiber in your diet  Some examples of whole-grain foods are whole-wheat breads and pastas, oatmeal, brown rice, and bulgur  · Eat a variety of vegetables every day  Include dark, leafy greens such as spinach, kale, cyrus greens, and mustard greens  Eat yellow and orange vegetables such as carrots, sweet potatoes, and winter squash  · Eat a variety of fruits every day  Choose fresh or canned fruit (canned in its own juice or light syrup) instead of juice  Fruit juice has very little or no fiber  · Eat low-fat dairy foods  Drink fat-free (skim) milk or 1% milk  Eat fat-free yogurt and low-fat cottage cheese  Try low-fat cheeses such as mozzarella and other reduced-fat cheeses  · Choose meat and other protein foods that are low in fat  Choose beans or other legumes such as split peas or lentils   Choose fish, skinless poultry (chicken or turkey), or lean cuts of red meat (beef or pork)  Before you cook meat or poultry, cut off any visible fat  · Use less fat and oil  Try baking foods instead of frying them  Add less fat, such as margarine, sour cream, regular salad dressing and mayonnaise to foods  Eat fewer high-fat foods  Some examples of high-fat foods include french fries, doughnuts, ice cream, and cakes  · Eat fewer sweets  Limit foods and drinks that are high in sugar  This includes candy, cookies, regular soda, and sweetened drinks  Ways to decrease calories:   · Eat smaller portions  ¨ Use a small plate with smaller servings  ¨ Do not eat second helpings  ¨ When you eat at a restaurant, ask for a box and place half of your meal in the box before you eat  ¨ Share an entrée with someone else  · Replace high-calorie snacks with healthy, low-calorie snacks  ¨ Choose fresh fruit, vegetables, fat-free rice cakes, or air-popped popcorn instead of potato chips, nuts, or chocolate  ¨ Choose water or calorie-free drinks instead of soda or sweetened drinks  · Eat regular meals  Skipping meals can lead to overeating later in the day  Eat a healthy snack in place of a meal if you do not have time to eat a regular meal      · Do not shop for groceries when you are hungry  You may be more likely to make unhealthy food choices  Take a grocery list of healthy foods and shop after you have eaten  Exercise:  Exercise at least 30 minutes per day on most days of the week  Some examples of exercise include walking, biking, dancing, and swimming  You can also fit in more physical activity by taking the stairs instead of the elevator or parking farther away from stores  Ask your healthcare provider about the best exercise plan for you  Other things to consider as you try to lose weight:   · Be aware of situations that may give you the urge to overeat, such as eating while watching television  Find ways to avoid these situations   For example, read a book, go for a walk, or do crafts  · Meet with a weight loss support group or friends who are also trying to lose weight  This may help you stay motivated to continue working on your weight loss goals  © 2017 [address]  Information is for End User's use only and may not be sold, redistributed or otherwise used for commercial purposes  All illustrations and images included in CareNotes® are the copyrighted property of A [employer] M [employer] Inc  or [employer] Pino  The above information is an  only  It is not intended as medical advice for individual conditions or treatments  Talk to your doctor, nurse or pharmacist before following any medical regimen to see if it is safe and effective for you  Heart Healthy Diet   AMBULATORY CARE:   A heart healthy diet  is an eating plan low in total fat, unhealthy fats, and sodium (salt)  A heart healthy diet helps decrease your risk for heart disease and stroke  Limit the amount of fat you eat to 25% to 35% of your total daily calories  Limit sodium to less than 2,300 mg each day  Healthy fats:  Healthy fats can help improve cholesterol levels  The risk for heart disease is decreased when cholesterol levels are normal  Choose healthy fats, such as the following:  · Unsaturated fat  is found in foods such as soybean, canola, olive, corn, and safflower oils  It is also found in soft tub margarine that is made with liquid vegetable oil  · Omega-3 fat  is found in certain fish, such as salmon, tuna, and trout, and in walnuts and flaxseed  Unhealthy fats:  Unhealthy fats can cause unhealthy cholesterol levels in your blood and increase your risk of heart disease  Limit unhealthy fats, such as the following:  · Cholesterol  is found in animal foods, such as eggs and lobster, and in dairy products made from whole milk  Limit cholesterol to less than 300 milligrams (mg) each day  You may need to limit cholesterol to 200 mg each day if you have heart disease  · Saturated fat  is found in meats, such as wong and hamburger  It is also found in chicken or turkey skin, whole milk, and butter  Limit saturated fat to less than 7% of your total daily calories  Limit saturated fat to less than 6% if you have heart disease or are at increased risk for it  · Trans fat  is found in packaged foods, such as potato chips and cookies  It is also in hard margarine, some fried foods, and shortening  Avoid trans fats as much as possible    Heart healthy foods and drinks to include:  Ask your dietitian or healthcare provider how many servings to have from each of the following food groups:  · Grains:      ¨ Whole-wheat breads, cereals, and pastas, and brown rice    ¨ Low-fat, low-sodium crackers and chips    · Vegetables:      ¨ Broccoli, green beans, green peas, and spinach    ¨ Collards, kale, and lima beans    ¨ Carrots, sweet potatoes, tomatoes, and peppers    ¨ Canned vegetables with no salt added    · Fruits:      ¨ Bananas, peaches, pears, and pineapple    ¨ Grapes, raisins, and dates    ¨ Oranges, tangerines, grapefruit, orange juice, and grapefruit juice    ¨ Apricots, mangoes, melons, and papaya    ¨ Raspberries and strawberries    ¨ Canned fruit with no added sugar    · Low-fat dairy products:      ¨ Nonfat (skim) milk, 1% milk, and low-fat almond, cashew, or soy milks fortified with calcium    ¨ Low-fat cheese, regular or frozen yogurt, and cottage cheese    · Meats and proteins , such as lean cuts of beef and pork (loin, leg, round), skinless chicken and turkey, legumes, soy products, egg whites, and nuts  Foods and drinks to limit or avoid:  Ask your dietitian or healthcare provider about these and other foods that are high in unhealthy fat, sodium, and sugar:  · Snack or packaged foods , such as frozen dinners, cookies, macaroni and cheese, and cereals with more than 300 mg of sodium per serving    · Canned or dry mixes  for cakes, soups, sauces, or gravies    · Vegetables with added sodium , such as instant potatoes, vegetables with added sauces, or regular canned vegetables    · Other foods high in sodium , such as ketchup, barbecue sauce, salad dressing, pickles, olives, soy sauce, and miso    · High-fat dairy foods  such as whole or 2% milk, cream cheese, or sour cream, and cheeses     · High-fat protein foods  such as high-fat cuts of beef (T-bone steaks, ribs), chicken or turkey with skin, and organ meats, such as liver    · Cured or smoked meats , such as hot dogs, wong, and sausage    · Unhealthy fats and oils , such as butter, stick margarine, shortening, and cooking oils such as coconut or palm oil    · Food and drinks high in sugar , such as soft drinks (soda), sports drinks, sweetened tea, candy, cake, cookies, pies, and doughnuts  Other diet guidelines to follow:   · Eat more foods containing omega-3 fats  Eat fish high in omega-3 fats at least 2 times a week  · Limit alcohol  Too much alcohol can damage your heart and raise your blood pressure  Women should limit alcohol to 1 drink a day  Men should limit alcohol to 2 drinks a day  A drink of alcohol is 12 ounces of beer, 5 ounces of wine, or 1½ ounces of liquor  · Choose low-sodium foods  High-sodium foods can lead to high blood pressure  Add little or no salt to food you prepare  Use herbs and spices in place of salt  · Eat more fiber  to help lower cholesterol levels  Eat at least 5 servings of fruits and vegetables each day  Eat 3 ounces of whole-grain foods each day  Legumes (beans) are also a good source of fiber  Lifestyle guidelines:   · Do not smoke  Nicotine and other chemicals in cigarettes and cigars can cause lung and heart damage  Ask your healthcare provider for information if you currently smoke and need help to quit  E-cigarettes or smokeless tobacco still contain nicotine  Talk to your healthcare provider before you use these products       · Exercise regularly  to help you maintain a healthy weight and improve your blood pressure and cholesterol levels  Ask your healthcare provider about the best exercise plan for you  Do not start an exercise program without asking your healthcare provider  Follow up with your healthcare provider as directed:  Write down your questions so you remember to ask them during your visits  © 2017 [address] Information is for End User's use only and may not be sold, redistributed or otherwise used for commercial purposes  All illustrations and images included in CareNotes® are the copyrighted property of [employer]  or [employer] Pino  The above information is an  only  It is not intended as medical advice for individual conditions or treatments  Talk to your doctor, nurse or pharmacist before following any medical regimen to see if it is safe and effective for you

## 2019-11-14 ENCOUNTER — OFFICE VISIT (OUTPATIENT)
Dept: URGENT CARE | Facility: CLINIC | Age: 54
End: 2019-11-14
Payer: COMMERCIAL

## 2019-11-14 ENCOUNTER — APPOINTMENT (OUTPATIENT)
Dept: RADIOLOGY | Facility: CLINIC | Age: 54
End: 2019-11-14
Payer: COMMERCIAL

## 2019-11-14 VITALS
BODY MASS INDEX: 38.8 KG/M2 | DIASTOLIC BLOOD PRESSURE: 73 MMHG | TEMPERATURE: 98.1 F | OXYGEN SATURATION: 98 % | HEART RATE: 79 BPM | SYSTOLIC BLOOD PRESSURE: 155 MMHG | WEIGHT: 262 LBS | HEIGHT: 69 IN | RESPIRATION RATE: 16 BRPM

## 2019-11-14 DIAGNOSIS — S93.491A SPRAIN OF POSTERIOR TALOFIBULAR LIGAMENT OF RIGHT ANKLE, INITIAL ENCOUNTER: ICD-10-CM

## 2019-11-14 DIAGNOSIS — S93.491A SPRAIN OF POSTERIOR TALOFIBULAR LIGAMENT OF RIGHT ANKLE, INITIAL ENCOUNTER: Primary | ICD-10-CM

## 2019-11-14 PROCEDURE — 99213 OFFICE O/P EST LOW 20 MIN: CPT | Performed by: EMERGENCY MEDICINE

## 2019-11-14 PROCEDURE — 73610 X-RAY EXAM OF ANKLE: CPT

## 2019-11-14 NOTE — PROGRESS NOTES
[facility]        NAME: Nicolas Chadwick is a 48 y o  female  : 1965    MRN: [id number]  DATE: 2019  TIME: 6:15 PM    Assessment and Plan   Sprain of posterior talofibular ligament of right ankle, initial encounter [S93 491A]  1  Sprain of posterior talofibular ligament of right ankle, initial encounter  XR ankle 3+ vw right     Xray ankle:  No fracture    Patient Instructions     Patient Instructions     Ankle Sprain   WHAT YOU NEED TO KNOW:   An ankle sprain happens when 1 or more ligaments in your ankle joint stretch or tear  Ligaments are tough tissues that connect bones  Ligaments support your joints and keep your bones in place  DISCHARGE INSTRUCTIONS:   Return to the emergency department if:   · You have severe pain in your ankle  · Your foot or toes are cold or numb  · Your ankle becomes more weak or unstable (wobbly)  · You are unable to put any weight on your ankle or foot  · Your swelling has increased or returned  Contact your healthcare provider if:   · Your pain does not go away, even after treatment  · You have questions or concerns about your condition or care  Medicines: You may need any of the following:  · NSAIDs , such as ibuprofen, help decrease swelling, pain, and fever  This medicine is available with or without a doctor's order  NSAIDs can cause stomach bleeding or kidney problems in certain people  If you take blood thinner medicine, always ask your healthcare provider if NSAIDs are safe for you  Always read the medicine label and follow directions  · Acetaminophen  decreases pain  It is available without a doctor's order  Ask how much to take and how often to take it  Follow directions  Acetaminophen can cause liver damage if not taken correctly  · Prescription pain medicine  may be given  Ask how to take this medicine safely  · Take your medicine as directed    Contact your healthcare provider if you think your medicine is not helping or if you have side effects  Tell him or her if you are allergic to any medicine  Keep a list of the medicines, vitamins, and herbs you take  Include the amounts, and when and why you take them  Bring the list or the pill bottles to follow-up visits  Carry your medicine list with you in case of an emergency  Self care:   · Use support devices,  such as a brace, cast, or splint, may be needed to limit your movement and protect your joint  You may need to use crutches to decrease your pain as you move around  · Go to physical therapy as directed  A physical therapist teaches you exercises to help improve movement and strength, and to decrease pain  · Rest  your ankle so that it can heal  Return to normal activities as directed  · Apply ice on your ankle for 15 to 20 minutes every hour or as directed  Use an ice pack, or put crushed ice in a plastic bag  Cover it with a towel  Ice helps prevent tissue damage and decreases swelling and pain  · Compress  your ankle  Ask if you should wrap an elastic bandage around your injured ligament  An elastic bandage provides support and helps decrease swelling and movement so your joint can heal  Wear as long as directed  · Elevate  your ankle above the level of your heart as often as you can  This will help decrease swelling and pain  Prop your ankle on pillows or blankets to keep it elevated comfortably  Prevent another ankle sprain:   · Let your ankle heal   Find out how long your ligament needs to heal  Do not do any physical activity until your healthcare provider says it is okay  If you start activity too soon, you may develop a more serious injury  · Always warm up and stretch  before you exercise or play sports  · Use the right equipment  Always wear shoes that fit well and are made for the activity that you are doing  You may also need ankle supports, elbow and knee pads, or braces    Follow up with your healthcare provider as directed:  Write down your questions so you remember to ask them during your visits  © 2017 [employer] Information is for End User's use only and may not be sold, redistributed or otherwise used for commercial purposes  All illustrations and images included in CareNotes® are the copyrighted property of [employer]  or [employer]  The above information is an  only  It is not intended as medical advice for individual conditions or treatments  Talk to your doctor, nurse or pharmacist before following any medical regimen to see if it is safe and effective for you  Follow up with PCP in 3-5 days  Proceed to  ER if symptoms worsen  Chief Complaint     Chief Complaint   Patient presents with    Ankle Pain     right x 10 days         History of Present Illness       This is a 30-year-old female with complaint of pain in her right ankle  She states she twisted it on November 3rd  She also states it is still swollen laterally and painful  She has had no previous injury to this ankle  Review of Systems   Review of Systems   Constitutional: Negative for fever  Musculoskeletal:        Pain in the right ankle           Current Medications       Current Outpatient Medications:     albuterol (VENTOLIN HFA) 90 mcg/act inhaler, Inhale 2 puffs every 6 (six) hours as needed for wheezing, Disp: 1 Inhaler, Rfl: 1    Cholecalciferol (VITAMIN D3) 2000 units capsule, Take 1 tablet by mouth daily, Disp: , Rfl:     levothyroxine 100 mcg tablet, Take 1 tablet (100 mcg total) by mouth daily, Disp: 90 tablet, Rfl: 1    multivitamin (THERAGRAN) TABS, Take 1 tablet by mouth daily, Disp: , Rfl:     Current Allergies     Allergies as of 11/14/2019 - Reviewed 11/14/2019   Allergen Reaction Noted    Monosodium glutamate Hives and Itching 11/02/2017            The following portions of the patient's history were reviewed and updated as appropriate: allergies, current medications, past family history, past medical history, past social history, past surgical history and problem list      Past Medical History:   Diagnosis Date    Thyroid disorder        Past Surgical History:   Procedure Laterality Date     SECTION         Family History   Problem Relation Age of Onset    Stroke Mother         cerebrovascular accident (CVA)    Other Mother         Epilepsy    Hypertension Father     Basal cell carcinoma Father     Heart attack Father         myocardial infarction    Other Other         epilepsy    Other Other         epilepsy         Medications have been verified  Objective   /73   Pulse 79   Temp 98 1 °F (36 7 °C)   Resp 16   Ht 5' 9" (1 753 m)   Wt 119 kg (262 lb)   SpO2 98%   BMI 38 69 kg/m²        Physical Exam     Physical Exam   Constitutional: She is oriented to person, place, and time  She appears well-developed and well-nourished  Over weight for height  HENT:   Head: Normocephalic and atraumatic  Nose: Nose normal    Eyes: Pupils are equal, round, and reactive to light  Conjunctivae and EOM are normal    Neck: Normal range of motion  Neck supple  Cardiovascular: Normal rate  Pulmonary/Chest: Effort normal    Musculoskeletal: Normal range of motion  There is minimal swelling to the right lateral malleolar area with slight tenderness to palpation in the same area  Patient has full range of motion of her foot  There is a good dorsalis pedis  There is good color and warmth to the foot  The knee is within normal limits  Neurological: She is alert and oriented to person, place, and time  Skin: Skin is warm and dry  Psychiatric: She has a normal mood and affect  Nursing note and vitals reviewed

## 2019-11-14 NOTE — PATIENT INSTRUCTIONS

## 2020-01-08 DIAGNOSIS — E03.9 HYPOTHYROIDISM, UNSPECIFIED TYPE: ICD-10-CM

## 2020-01-09 RX ORDER — LEVOTHYROXINE SODIUM 0.1 MG/1
TABLET ORAL
Qty: 90 TABLET | Refills: 1 | Status: SHIPPED | OUTPATIENT
Start: 2020-01-09 | End: 2020-07-07

## 2020-01-17 ENCOUNTER — APPOINTMENT (OUTPATIENT)
Dept: LAB | Facility: CLINIC | Age: 55
End: 2020-01-17
Payer: COMMERCIAL

## 2020-01-17 DIAGNOSIS — E55.9 VITAMIN D DEFICIENCY: ICD-10-CM

## 2020-01-17 DIAGNOSIS — E03.9 HYPOTHYROIDISM, UNSPECIFIED TYPE: ICD-10-CM

## 2020-01-17 DIAGNOSIS — Z13.220 LIPID SCREENING: ICD-10-CM

## 2020-01-17 LAB
ALBUMIN SERPL BCP-MCNC: 3.9 G/DL (ref 3.5–5)
ALP SERPL-CCNC: 54 U/L (ref 46–116)
ALT SERPL W P-5'-P-CCNC: 36 U/L (ref 12–78)
ANION GAP SERPL CALCULATED.3IONS-SCNC: 3 MMOL/L (ref 4–13)
AST SERPL W P-5'-P-CCNC: 16 U/L (ref 5–45)
BILIRUB SERPL-MCNC: 0.58 MG/DL (ref 0.2–1)
BUN SERPL-MCNC: 15 MG/DL (ref 5–25)
CALCIUM SERPL-MCNC: 9.1 MG/DL (ref 8.3–10.1)
CHLORIDE SERPL-SCNC: 108 MMOL/L (ref 100–108)
CHOLEST SERPL-MCNC: 178 MG/DL (ref 50–200)
CO2 SERPL-SCNC: 30 MMOL/L (ref 21–32)
CREAT SERPL-MCNC: 0.76 MG/DL (ref 0.6–1.3)
EST. AVERAGE GLUCOSE BLD GHB EST-MCNC: 114 MG/DL
GFR SERPL CREATININE-BSD FRML MDRD: 89 ML/MIN/1.73SQ M
GLUCOSE P FAST SERPL-MCNC: 93 MG/DL (ref 65–99)
HBA1C MFR BLD: 5.6 % (ref 4.2–6.3)
HDLC SERPL-MCNC: 52 MG/DL
LDLC SERPL CALC-MCNC: 111 MG/DL (ref 0–100)
NONHDLC SERPL-MCNC: 126 MG/DL
POTASSIUM SERPL-SCNC: 4.5 MMOL/L (ref 3.5–5.3)
PROT SERPL-MCNC: 7.2 G/DL (ref 6.4–8.2)
SODIUM SERPL-SCNC: 141 MMOL/L (ref 136–145)
T4 FREE SERPL-MCNC: 1.34 NG/DL (ref 0.76–1.46)
TRIGL SERPL-MCNC: 73 MG/DL
TSH SERPL DL<=0.05 MIU/L-ACNC: 2.78 UIU/ML (ref 0.36–3.74)

## 2020-01-17 PROCEDURE — 80053 COMPREHEN METABOLIC PANEL: CPT

## 2020-01-17 PROCEDURE — 84443 ASSAY THYROID STIM HORMONE: CPT

## 2020-01-17 PROCEDURE — 83036 HEMOGLOBIN GLYCOSYLATED A1C: CPT

## 2020-01-17 PROCEDURE — 82306 VITAMIN D 25 HYDROXY: CPT

## 2020-01-17 PROCEDURE — 80061 LIPID PANEL: CPT

## 2020-01-17 PROCEDURE — 36415 COLL VENOUS BLD VENIPUNCTURE: CPT

## 2020-01-17 PROCEDURE — 84439 ASSAY OF FREE THYROXINE: CPT

## 2020-01-18 LAB — 25(OH)D3 SERPL-MCNC: 56.8 NG/ML (ref 30–100)

## 2020-01-20 ENCOUNTER — TELEPHONE (OUTPATIENT)
Dept: FAMILY MEDICINE CLINIC | Facility: CLINIC | Age: 55
End: 2020-01-20

## 2020-01-31 ENCOUNTER — OFFICE VISIT (OUTPATIENT)
Dept: FAMILY MEDICINE CLINIC | Facility: CLINIC | Age: 55
End: 2020-01-31
Payer: COMMERCIAL

## 2020-01-31 VITALS
HEART RATE: 94 BPM | DIASTOLIC BLOOD PRESSURE: 80 MMHG | SYSTOLIC BLOOD PRESSURE: 120 MMHG | BODY MASS INDEX: 38.1 KG/M2 | WEIGHT: 258 LBS | RESPIRATION RATE: 16 BRPM | TEMPERATURE: 97.8 F | OXYGEN SATURATION: 97 %

## 2020-01-31 DIAGNOSIS — L81.9 DISCOLORATION OF SKIN OF FACE: ICD-10-CM

## 2020-01-31 DIAGNOSIS — E03.9 HYPOTHYROIDISM, UNSPECIFIED TYPE: ICD-10-CM

## 2020-01-31 DIAGNOSIS — J45.21 MILD INTERMITTENT ASTHMA WITH ACUTE EXACERBATION: Primary | ICD-10-CM

## 2020-01-31 DIAGNOSIS — Z12.39 BREAST CANCER SCREENING: ICD-10-CM

## 2020-01-31 DIAGNOSIS — Z80.8 FAMILY HISTORY OF SKIN CANCER: ICD-10-CM

## 2020-01-31 PROBLEM — R03.0 BLOOD PRESSURE ELEVATED WITHOUT HISTORY OF HTN: Status: RESOLVED | Noted: 2017-11-02 | Resolved: 2020-01-31

## 2020-01-31 PROCEDURE — 99213 OFFICE O/P EST LOW 20 MIN: CPT | Performed by: PHYSICIAN ASSISTANT

## 2020-01-31 PROCEDURE — 1036F TOBACCO NON-USER: CPT | Performed by: PHYSICIAN ASSISTANT

## 2020-01-31 RX ORDER — ALBUTEROL SULFATE 90 UG/1
2 AEROSOL, METERED RESPIRATORY (INHALATION) EVERY 6 HOURS PRN
Qty: 2 INHALER | Refills: 3 | Status: SHIPPED | OUTPATIENT
Start: 2020-01-31 | End: 2020-12-03 | Stop reason: SDUPTHER

## 2020-01-31 NOTE — PROGRESS NOTES
Routine Follow-up    Maximiliano Lei 47 y o  female   Date:  1/31/2020      Assessment and Plan:    Jorgito Lewis was seen today for follow-up  Diagnoses and all orders for this visit:    Mild intermittent asthma with acute exacerbation  -     albuterol (VENTOLIN HFA) 90 mcg/act inhaler; Inhale 2 puffs every 6 (six) hours as needed for wheezing  - stable  - zyrtec prn due to dust and allergies    Breast cancer screening  -     Mammo screening bilateral w 3d & cad; Future    Hypothyroidism, unspecified type  -     TSH, 3rd generation; Future  -     T4, free; Future  - stable, no changes in synthroid    Family history of skin cancer  -     Ambulatory referral to Dermatology; Future    Discoloration of skin of face  -     Ambulatory referral to Dermatology; Future  - unable to evaluate with her make up on, no raised lesions though which is reassuring however she would like to start with preventative yearly skin exam s            BMI Counseling: Body mass index is 38 1 kg/m²  The BMI is above normal  Nutrition recommendations include decreasing portion sizes, limiting drinks that contain sugar, moderation in carbohydrate intake, increasing intake of lean protein, reducing intake of saturated and trans fat and reducing intake of cholesterol  Exercise recommendations include exercising 3-5 times per week  HPI:  Chief Complaint   Patient presents with    Follow-up     review testing      HPI   Patient is a 46 yo female who presents for routine follow up  She feels well  She is renovating her home, dust is bothering her so using inhaler a little more frequently  Her labs were stable  She reports that her father has hx of skin cancer  She would like to start yearly skin exams with derm  She has some discoloration along her nose, not raised  I cannot see it today as she has make up on  She has started improve her diet - changing lifestyle  She recently lost 6 lbs  ROS: Review of Systems   Constitutional: Negative  Respiratory: Negative  Cardiovascular: Negative  Gastrointestinal: Negative  Genitourinary: Negative  Skin: Positive for color change  Negative for rash and wound  Neurological: Negative  Psychiatric/Behavioral: Negative          Past Medical History:   Diagnosis Date    Thyroid disorder      Patient Active Problem List   Diagnosis    Asthma    Hyperlipidemia    Hypothyroidism    Vitamin D deficiency    Vertigo    Seasonal allergic rhinitis due to pollen       Past Surgical History:   Procedure Laterality Date     SECTION         Social History     Socioeconomic History    Marital status: /Civil Union     Spouse name: Not on file    Number of children: Not on file    Years of education: Not on file    Highest education level: Not on file   Occupational History    Not on file   Social Needs    Financial resource strain: Not on file    Food insecurity:     Worry: Not on file     Inability: Not on file    Transportation needs:     Medical: Not on file     Non-medical: Not on file   Tobacco Use    Smoking status: Never Smoker    Smokeless tobacco: Never Used   Substance and Sexual Activity    Alcohol use: No    Drug use: No    Sexual activity: Not on file   Lifestyle    Physical activity:     Days per week: Not on file     Minutes per session: Not on file    Stress: Not on file   Relationships    Social connections:     Talks on phone: Not on file     Gets together: Not on file     Attends [employer] service: Not on file     Active member of club or organization: Not on file     Attends meetings of clubs or organizations: Not on file     Relationship status: Not on file    Intimate partner violence:     Fear of current or ex partner: Not on file     Emotionally abused: Not on file     Physically abused: Not on file     Forced sexual activity: Not on file   Other Topics Concern    Not on file   Social History Narrative    Always uses seat belt    Caffeine use, 2 cups of coffee per day       Family History   Problem Relation Age of Onset    Stroke Mother         cerebrovascular accident (CVA)    Other Mother         Epilepsy    Hypertension Father     Basal cell carcinoma Father     Heart attack Father         myocardial infarction    Other Other         epilepsy    Other Other         epilepsy       Allergies   Allergen Reactions    Monosodium Glutamate Hives and Itching         Current Outpatient Medications:     albuterol (VENTOLIN HFA) 90 mcg/act inhaler, Inhale 2 puffs every 6 (six) hours as needed for wheezing, Disp: 2 Inhaler, Rfl: 3    Cholecalciferol (VITAMIN D3) 2000 units capsule, Take 1 tablet by mouth daily, Disp: , Rfl:     levothyroxine 100 mcg tablet, TAKE 1 TABLET DAILY, Disp: 90 tablet, Rfl: 1    multivitamin (THERAGRAN) TABS, Take 1 tablet by mouth daily, Disp: , Rfl:       Physical Exam:  /80   Pulse 94   Temp 97 8 °F (36 6 °C)   Resp 16   Wt 117 kg (258 lb)   SpO2 97%   BMI 38 10 kg/m²     Physical Exam   Constitutional: She is oriented to person, place, and time  She appears well-developed and well-nourished  No distress  obese   HENT:   Head: Normocephalic and atraumatic  Mouth/Throat: Oropharynx is clear and moist    Eyes: Pupils are equal, round, and reactive to light  Conjunctivae are normal    Neck: Normal range of motion  Neck supple  Cardiovascular: Normal rate and regular rhythm  No murmur heard  Pulmonary/Chest: Effort normal and breath sounds normal  No respiratory distress  She has no wheezes  Musculoskeletal: She exhibits no edema or deformity  Lymphadenopathy:     She has no cervical adenopathy  Neurological: She is alert and oriented to person, place, and time  No cranial nerve deficit  Skin: Skin is warm and dry  No rash noted  Psychiatric: She has a normal mood and affect   Her behavior is normal          Labs:  No results found for: WBC, HGB, HCT, MCV, PLT  Lab Results   Component Value Date  04/21/2018    K 4 5 01/17/2020     01/17/2020    CO2 30 01/17/2020    ANIONGAP 8 9 04/21/2018    BUN 15 01/17/2020    CREATININE 0 76 01/17/2020    GLUF 93 01/17/2020    CALCIUM 9 1 01/17/2020    AST 16 01/17/2020    ALT 36 01/17/2020    ALKPHOS 54 01/17/2020    PROT 6 8 04/21/2018    BILITOT 0 7 04/21/2018    EGFR 89 01/17/2020

## 2020-02-21 ENCOUNTER — OFFICE VISIT (OUTPATIENT)
Dept: FAMILY MEDICINE CLINIC | Facility: CLINIC | Age: 55
End: 2020-02-21
Payer: COMMERCIAL

## 2020-02-21 VITALS
DIASTOLIC BLOOD PRESSURE: 86 MMHG | SYSTOLIC BLOOD PRESSURE: 130 MMHG | HEIGHT: 68 IN | RESPIRATION RATE: 18 BRPM | HEART RATE: 82 BPM | OXYGEN SATURATION: 95 % | BODY MASS INDEX: 38.95 KG/M2 | TEMPERATURE: 97.4 F | WEIGHT: 257 LBS

## 2020-02-21 DIAGNOSIS — Z91.09 ENVIRONMENTAL ALLERGIES: ICD-10-CM

## 2020-02-21 DIAGNOSIS — J45.21 MILD INTERMITTENT ASTHMA WITH EXACERBATION: Primary | ICD-10-CM

## 2020-02-21 PROCEDURE — 3008F BODY MASS INDEX DOCD: CPT | Performed by: PHYSICIAN ASSISTANT

## 2020-02-21 PROCEDURE — 1036F TOBACCO NON-USER: CPT | Performed by: PHYSICIAN ASSISTANT

## 2020-02-21 PROCEDURE — 99213 OFFICE O/P EST LOW 20 MIN: CPT | Performed by: PHYSICIAN ASSISTANT

## 2020-02-21 RX ORDER — MONTELUKAST SODIUM 10 MG/1
10 TABLET ORAL
Qty: 30 TABLET | Refills: 1 | Status: SHIPPED | OUTPATIENT
Start: 2020-02-21 | End: 2021-02-12

## 2020-02-21 RX ORDER — PREDNISONE 20 MG/1
TABLET ORAL
Qty: 12 TABLET | Refills: 0 | Status: SHIPPED | OUTPATIENT
Start: 2020-02-21 | End: 2020-02-27 | Stop reason: ALTCHOICE

## 2020-02-21 NOTE — PROGRESS NOTES
Nasrin Goodwin 47 y o  female   Date:  2/21/2020      Assessment and Plan:    Ezequiel Samano was seen today for cough and asthma  Diagnoses and all orders for this visit:    Mild intermittent asthma with exacerbation  -     predniSONE 20 mg tablet; Take 3 tabs x 2 days, then 2 tabs x 2 days, then 1 tab x 2 days  -     montelukast (SINGULAIR) 10 mg tablet; Take 1 tablet (10 mg total) by mouth daily at bedtime  - continue albuterol use prn  - afebrile  - offered patient to take a mask to wear around house due to renovations    Environmental allergies  -     montelukast (SINGULAIR) 10 mg tablet; Take 1 tablet (10 mg total) by mouth daily at bedtime; take on opposite end of day from zyrtec               HPI:  Chief Complaint   Patient presents with    Cough     productive and yellow    Asthma     HPI   Patient is a 48 yo female with PMH of mild intermittent asthma and allergies who presents with concern for asthma flare up with cough and productive yellow mucus with recent exposure to dust due to renovations in her home over the past 3 weeks  She is wheezing a lot, waking up wheezing a lot  She feels very tight  She has been using her albuterol inhaler about 4 times per day and night  She is taking zyrtec daily  She otherwise has no fever, chills, sore throat, eat pain, headache, n/v/d      ROS: Review of Systems   Constitutional: Negative for appetite change, chills, diaphoresis and fever  HENT: Negative  Respiratory: Positive for cough, chest tightness, shortness of breath and wheezing  Cardiovascular: Negative  Gastrointestinal: Negative  Skin: Negative  Allergic/Immunologic: Positive for environmental allergies  Negative for immunocompromised state  Neurological: Negative          Past Medical History:   Diagnosis Date    Thyroid disorder      Patient Active Problem List   Diagnosis    Asthma    Hyperlipidemia    Hypothyroidism    Vitamin D deficiency    Vertigo    Seasonal allergic rhinitis due to pollen       Past Surgical History:   Procedure Laterality Date     SECTION         Social History     Socioeconomic History    Marital status: /Civil Union     Spouse name: None    Number of children: None    Years of education: None    Highest education level: None   Occupational History    None   Social Needs    Financial resource strain: None    Food insecurity:     Worry: None     Inability: None    Transportation needs:     Medical: None     Non-medical: None   Tobacco Use    Smoking status: Never Smoker    Smokeless tobacco: Never Used   Substance and Sexual Activity    Alcohol use: No    Drug use: No    Sexual activity: Yes     Partners: Male   Lifestyle    Physical activity:     Days per week: None     Minutes per session: None    Stress: None   Relationships    Social connections:     Talks on phone: None     Gets together: None     Attends Religion service: None     Active member of club or organization: None     Attends meetings of clubs or organizations: None     Relationship status: None    Intimate partner violence:     Fear of current or ex partner: None     Emotionally abused: None     Physically abused: None     Forced sexual activity: None   Other Topics Concern    None   Social History Narrative    Always uses seat belt    Caffeine use, 2 cups of coffee per day       Family History   Problem Relation Age of Onset    Stroke Mother         cerebrovascular accident (CVA)    Other Mother         Epilepsy    Hypertension Father     Basal cell carcinoma Father     Heart attack Father         myocardial infarction    Other Other         epilepsy    Other Other         epilepsy       Allergies   Allergen Reactions    Monosodium Glutamate Hives and Itching         Current Outpatient Medications:     albuterol (VENTOLIN HFA) 90 mcg/act inhaler, Inhale 2 puffs every 6 (six) hours as needed for wheezing, Disp: 2 Inhaler, Rfl: 3    Cholecalciferol (VITAMIN D3) 2000 units capsule, Take 1 tablet by mouth daily, Disp: , Rfl:     levothyroxine 100 mcg tablet, TAKE 1 TABLET DAILY, Disp: 90 tablet, Rfl: 1    multivitamin (THERAGRAN) TABS, Take 1 tablet by mouth daily, Disp: , Rfl:     montelukast (SINGULAIR) 10 mg tablet, Take 1 tablet (10 mg total) by mouth daily at bedtime, Disp: 30 tablet, Rfl: 1    predniSONE 20 mg tablet, Take 3 tabs x 2 days, then 2 tabs x 2 days, then 1 tab x 2 days  , Disp: 12 tablet, Rfl: 0      Physical Exam:  /86   Pulse 82   Temp (!) 97 4 °F (36 3 °C)   Resp 18   Ht 5' 8" (1 727 m)   Wt 117 kg (257 lb)   SpO2 95%   BMI 39 08 kg/m²     Physical Exam   Constitutional: She is oriented to person, place, and time  She appears well-developed and well-nourished  No distress  HENT:   Head: Normocephalic and atraumatic  Right Ear: Tympanic membrane and ear canal normal    Left Ear: Tympanic membrane and ear canal normal    Nose: Mucosal edema present  No rhinorrhea  Mouth/Throat: Uvula is midline, oropharynx is clear and moist and mucous membranes are normal    Eyes: Pupils are equal, round, and reactive to light  Conjunctivae are normal    Neck: Normal range of motion  Neck supple  Cardiovascular: Normal rate, regular rhythm and normal heart sounds  Pulmonary/Chest: Effort normal  No stridor  No respiratory distress  She has wheezes (apical expiratory)  She has no rales  She exhibits no tenderness  Musculoskeletal: She exhibits no edema  Lymphadenopathy:     She has no cervical adenopathy  Neurological: She is alert and oriented to person, place, and time  No cranial nerve deficit  Skin: Skin is warm and dry  No rash noted  No pallor  Psychiatric: She has a normal mood and affect   Her behavior is normal          Labs:  No results found for: WBC, HGB, HCT, MCV, PLT  Lab Results   Component Value Date     04/21/2018    K 4 5 01/17/2020     01/17/2020    CO2 30 01/17/2020    ANIONGAP 8 9 04/21/2018    BUN 15 01/17/2020    CREATININE 0 76 01/17/2020    GLUF 93 01/17/2020    CALCIUM 9 1 01/17/2020    AST 16 01/17/2020    ALT 36 01/17/2020    ALKPHOS 54 01/17/2020    PROT 6 8 04/21/2018    BILITOT 0 7 04/21/2018    EGFR 89 01/17/2020

## 2020-04-16 ENCOUNTER — TELEMEDICINE (OUTPATIENT)
Dept: FAMILY MEDICINE CLINIC | Facility: CLINIC | Age: 55
End: 2020-04-16
Payer: COMMERCIAL

## 2020-04-16 DIAGNOSIS — R42 VERTIGO: ICD-10-CM

## 2020-04-16 DIAGNOSIS — J01.10 ACUTE FRONTAL SINUSITIS, RECURRENCE NOT SPECIFIED: Primary | ICD-10-CM

## 2020-04-16 PROCEDURE — 99213 OFFICE O/P EST LOW 20 MIN: CPT | Performed by: FAMILY MEDICINE

## 2020-04-16 RX ORDER — AMOXICILLIN AND CLAVULANATE POTASSIUM 875; 125 MG/1; MG/1
1 TABLET, FILM COATED ORAL EVERY 12 HOURS SCHEDULED
Qty: 14 TABLET | Refills: 0 | Status: SHIPPED | OUTPATIENT
Start: 2020-04-16 | End: 2020-04-23

## 2020-07-07 ENCOUNTER — OFFICE VISIT (OUTPATIENT)
Dept: FAMILY MEDICINE CLINIC | Facility: CLINIC | Age: 55
End: 2020-07-07
Payer: COMMERCIAL

## 2020-07-07 VITALS
OXYGEN SATURATION: 98 % | TEMPERATURE: 97.3 F | HEART RATE: 61 BPM | DIASTOLIC BLOOD PRESSURE: 78 MMHG | SYSTOLIC BLOOD PRESSURE: 120 MMHG | RESPIRATION RATE: 17 BRPM

## 2020-07-07 DIAGNOSIS — E03.9 HYPOTHYROIDISM, UNSPECIFIED TYPE: ICD-10-CM

## 2020-07-07 DIAGNOSIS — R22.32 AXILLARY MASS, LEFT: Primary | ICD-10-CM

## 2020-07-07 PROCEDURE — 99213 OFFICE O/P EST LOW 20 MIN: CPT | Performed by: FAMILY MEDICINE

## 2020-07-07 PROCEDURE — 1036F TOBACCO NON-USER: CPT | Performed by: FAMILY MEDICINE

## 2020-07-07 RX ORDER — LEVOTHYROXINE SODIUM 0.1 MG/1
TABLET ORAL
Qty: 90 TABLET | Refills: 1 | Status: SHIPPED | OUTPATIENT
Start: 2020-07-07 | End: 2021-01-03

## 2020-07-07 NOTE — PROGRESS NOTES
Assessment/Plan:         Diagnoses and all orders for this visit:    Axillary mass, left  -     Ambulatory referral to General Surgery; Future          Subjective:      Patient ID: Antolin Schumacher is a 47 y o  female  Same day sick appt   I have not seen this pt since 2018- she follows with our other provider  Pt states she noticed it originally about 5-6 years ago, but now for 2-3 months, "would flare up and die down, but now pretty consistently painful and does get tingly" and causing some dysfunction in her arm- interferes with certain activities  "feels like I'm always carrying something under my arm"      The following portions of the patient's history were reviewed and updated as appropriate: allergies, current medications, past family history, past medical history, past social history, past surgical history and problem list     Review of Systems   All other systems reviewed and are negative  Objective:      /78 (BP Location: Left arm, Patient Position: Sitting)   Pulse 61   Temp (!) 97 3 °F (36 3 °C)   Resp 17   SpO2 98%          Physical Exam   Constitutional: She is oriented to person, place, and time  She appears well-developed  She is cooperative  Non-toxic appearance  She does not have a sickly appearance  She does not appear ill  No distress  HENT:   Head: Normocephalic and atraumatic  Neck: Phonation normal    Pulmonary/Chest: Effort normal    Lymphadenopathy:     She has no cervical adenopathy  She has no axillary adenopathy  Right: No supraclavicular adenopathy present  Left: No supraclavicular adenopathy present  Neurological: She is alert and oriented to person, place, and time  Skin: Skin is warm and dry  Capillary refill takes less than 2 seconds  No ecchymosis noted  She is not diaphoretic  No cyanosis or erythema  No pallor  Psychiatric: She has a normal mood and affect

## 2020-07-29 ENCOUNTER — CONSULT (OUTPATIENT)
Dept: SURGERY | Facility: CLINIC | Age: 55
End: 2020-07-29
Payer: COMMERCIAL

## 2020-07-29 VITALS
TEMPERATURE: 97.6 F | BODY MASS INDEX: 39.31 KG/M2 | HEART RATE: 71 BPM | SYSTOLIC BLOOD PRESSURE: 134 MMHG | DIASTOLIC BLOOD PRESSURE: 80 MMHG | WEIGHT: 265.4 LBS | HEIGHT: 69 IN

## 2020-07-29 DIAGNOSIS — R22.32 AXILLARY MASS, LEFT: ICD-10-CM

## 2020-07-29 PROCEDURE — 99243 OFF/OP CNSLTJ NEW/EST LOW 30: CPT | Performed by: SURGERY

## 2020-07-29 NOTE — PROGRESS NOTES
Assessment/Plan:    Axillary mass, left   Difficult to  Palpate a discrete mass on exam    I will get an ultrasound to evaluate this area more closely  Patient feels that upon palpation is area it results in numbness and tingling of her hand however this may be more due to hold in the arm above the head  I will start with ultrasound  If this is normal she may need evaluation for her cervical spine or possible nerve conduction studies  Diagnoses and all orders for this visit:    Axillary mass, left  -     Ambulatory referral to General Surgery  -     US extremity soft tissue; Future          Subjective:      Patient ID: Ahsan Roman is a 47 y o  female  Ms Elke Lake  Is a 51-year-old female that is here for evaluation of a left axillary mass  She states that she feels that there is a mass that has been present for last 7 years  She had been evaluated year and a half ago without any definitive findings  She states now that she has been starts having some tingling and numbness in her hands and her arm and she attributed to this mass  She denies any redness of the skin denies any drainage from the axilla  Denies any fevers or chills      The following portions of the patient's history were reviewed and updated as appropriate: allergies, current medications, past family history, past medical history, past social history, past surgical history and problem list     Review of Systems   Constitutional: Negative for chills, fever and unexpected weight change  HENT: Negative for congestion, sore throat and trouble swallowing  Eyes: Negative for discharge and itching  Respiratory: Negative for cough, shortness of breath and wheezing  Cardiovascular: Negative for chest pain and leg swelling  Gastrointestinal: Negative for abdominal distention and abdominal pain  Endocrine: Negative for cold intolerance and heat intolerance  Genitourinary: Negative for difficulty urinating, dysuria and frequency  Musculoskeletal: Negative for arthralgias, gait problem and joint swelling  Skin: Negative for color change and wound  Neurological: Negative for dizziness, numbness and headaches  Psychiatric/Behavioral: Negative for agitation and confusion  The patient is not nervous/anxious  Objective:      /80 (BP Location: Left arm, Patient Position: Sitting, Cuff Size: Standard)   Pulse 71   Temp 97 6 °F (36 4 °C) (Tympanic)   Ht 5' 9" (1 753 m)   Wt 120 kg (265 lb 6 4 oz)   BMI 39 19 kg/m²          Physical Exam   Constitutional: She is oriented to person, place, and time  She appears well-developed and well-nourished  She is active  No distress  HENT:   Head: Normocephalic and atraumatic  Eyes: Pupils are equal, round, and reactive to light  Neck: Normal range of motion  Neck supple  Cardiovascular: Normal rate, regular rhythm and normal heart sounds  No murmur heard  Pulmonary/Chest: Effort normal and breath sounds normal  No respiratory distress  She has no wheezes  Abdominal: Soft  Bowel sounds are normal    Musculoskeletal: Normal range of motion  Neurological: She is alert and oriented to person, place, and time  Skin: Skin is warm and dry  She is not diaphoretic  Left axilla on initial inspection appears there might be a linear soft tissue swelling  On exam difficult to palpate a discrete nodule  Patient states she has some tingling numbness upon palpation of this area   Psychiatric: She has a normal mood and affect  Her behavior is normal    Nursing note and vitals reviewed

## 2020-07-29 NOTE — ASSESSMENT & PLAN NOTE
Difficult to  Palpate a discrete mass on exam    I will get an ultrasound to evaluate this area more closely  Patient feels that upon palpation is area it results in numbness and tingling of her hand however this may be more due to hold in the arm above the head  I will start with ultrasound  If this is normal she may need evaluation for her cervical spine or possible nerve conduction studies

## 2020-07-30 DIAGNOSIS — M79.89 MASS OF SOFT TISSUE OF UPPER ARM: Primary | ICD-10-CM

## 2020-07-31 ENCOUNTER — HOSPITAL ENCOUNTER (OUTPATIENT)
Dept: ULTRASOUND IMAGING | Facility: HOSPITAL | Age: 55
Discharge: HOME | End: 2020-07-31
Payer: COMMERCIAL

## 2020-07-31 DIAGNOSIS — R22.32 AXILLARY MASS, LEFT: ICD-10-CM

## 2020-07-31 PROCEDURE — 76882 US LMTD JT/FCL EVL NVASC XTR: CPT

## 2020-08-07 ENCOUNTER — OFFICE VISIT (OUTPATIENT)
Dept: FAMILY MEDICINE CLINIC | Facility: CLINIC | Age: 55
End: 2020-08-07
Payer: COMMERCIAL

## 2020-08-07 VITALS
WEIGHT: 265 LBS | BODY MASS INDEX: 39.25 KG/M2 | DIASTOLIC BLOOD PRESSURE: 78 MMHG | TEMPERATURE: 97.4 F | HEART RATE: 83 BPM | HEIGHT: 69 IN | OXYGEN SATURATION: 99 % | SYSTOLIC BLOOD PRESSURE: 112 MMHG

## 2020-08-07 DIAGNOSIS — J45.21 MILD INTERMITTENT ASTHMA WITH ACUTE EXACERBATION: ICD-10-CM

## 2020-08-07 DIAGNOSIS — E03.9 HYPOTHYROIDISM, UNSPECIFIED TYPE: Primary | ICD-10-CM

## 2020-08-07 DIAGNOSIS — Z13.0 SCREENING FOR DEFICIENCY ANEMIA: ICD-10-CM

## 2020-08-07 DIAGNOSIS — Z91.09 ENVIRONMENTAL ALLERGIES: ICD-10-CM

## 2020-08-07 DIAGNOSIS — Z13.220 LIPID SCREENING: ICD-10-CM

## 2020-08-07 DIAGNOSIS — Z13.1 DIABETES MELLITUS SCREENING: ICD-10-CM

## 2020-08-07 DIAGNOSIS — Z12.39 BREAST CANCER SCREENING: ICD-10-CM

## 2020-08-07 DIAGNOSIS — J45.21 MILD INTERMITTENT ASTHMA WITH EXACERBATION: ICD-10-CM

## 2020-08-07 DIAGNOSIS — Z11.59 NEED FOR HEPATITIS C SCREENING TEST: ICD-10-CM

## 2020-08-07 PROCEDURE — 99214 OFFICE O/P EST MOD 30 MIN: CPT | Performed by: PHYSICIAN ASSISTANT

## 2020-08-07 PROCEDURE — 3008F BODY MASS INDEX DOCD: CPT | Performed by: PHYSICIAN ASSISTANT

## 2020-08-07 PROCEDURE — 1036F TOBACCO NON-USER: CPT | Performed by: PHYSICIAN ASSISTANT

## 2020-08-07 NOTE — PROGRESS NOTES
Routine Follow-up    Carly Chairez 47 y o  female   Date:  8/7/2020      Assessment and Plan:    Keeley Frank was seen today for follow-up  Diagnoses and all orders for this visit:    Hypothyroidism, unspecified type  -     TSH, 3rd generation; Future  -     T4, free; Future  - continue synthroid, repeat labs due in jan     Mild intermittent asthma   - stable     Environmental allergies  - start claritin and or otc flonase, defers script to be sent and will look otc     Lipid screening  -     Lipid panel; Future    Screening for deficiency anemia  -     CBC and differential; Future    Diabetes mellitus screening  -     Comprehensive metabolic panel; Future    Need for hepatitis C screening test  -     Hepatitis C antibody; Future    Breast cancer screening  -     Mammo screening bilateral w 3d & cad; Future             HPI:  Chief Complaint   Patient presents with    Follow-up     HPI   Patient is a 46 yo female who presents for routine follow up  She has seasonal allergies and has L ear fullness, crackling  When she used the Singulair, it worked really well  She has used it seasonally and when a lot of dust in house due to renovations  Her asthma has been controlled, not using albuterol much often  She is using [employer] for vitamins, has really helped her nails  It is a liquid mutlivitamin  She is due for mammogram  She goes for yearly labs, due in Jan      ROS: Review of Systems   Constitutional: Negative  HENT: Positive for congestion and sneezing  Negative for ear discharge and ear pain  Respiratory: Negative  Cardiovascular: Negative  Gastrointestinal: Negative  Allergic/Immunologic: Positive for environmental allergies  Neurological: Negative  Psychiatric/Behavioral: Negative          Past Medical History:   Diagnosis Date    Thyroid disorder      Patient Active Problem List   Diagnosis    Asthma    Hyperlipidemia    Hypothyroidism    Vitamin D deficiency    Vertigo    Seasonal allergic rhinitis due to pollen    Acute frontal sinusitis    Axillary mass, left       Past Surgical History:   Procedure Laterality Date     SECTION         Social History     Socioeconomic History    Marital status: /Civil Union     Spouse name: None    Number of children: None    Years of education: None    Highest education level: None   Occupational History    None   Social Needs    Financial resource strain: None    Food insecurity     Worry: None     Inability: None    Transportation needs     Medical: None     Non-medical: None   Tobacco Use    Smoking status: Never Smoker    Smokeless tobacco: Never Used   Substance and Sexual Activity    Alcohol use: No    Drug use: No    Sexual activity: Yes     Partners: Male   Lifestyle    Physical activity     Days per week: None     Minutes per session: None    Stress: None   Relationships    Social connections     Talks on phone: None     Gets together: None     Attends Yarsani service: None     Active member of club or organization: None     Attends meetings of clubs or organizations: None     Relationship status: None    Intimate partner violence     Fear of current or ex partner: None     Emotionally abused: None     Physically abused: None     Forced sexual activity: None   Other Topics Concern    None   Social History Narrative    Always uses seat belt    Caffeine use, 2 cups of coffee per day       Family History   Problem Relation Age of Onset    Stroke Mother         cerebrovascular accident (CVA)    Other Mother         Epilepsy    Hypertension Father     Basal cell carcinoma Father     Heart attack Father         myocardial infarction    Other Other         epilepsy    Other Other         epilepsy       Allergies   Allergen Reactions    Monosodium Glutamate Hives and Itching         Current Outpatient Medications:     albuterol (VENTOLIN HFA) 90 mcg/act inhaler, Inhale 2 puffs every 6 (six) hours as needed for wheezing, Disp: 2 Inhaler, Rfl: 3    Cholecalciferol (VITAMIN D3) 2000 units capsule, Take 1 tablet by mouth daily, Disp: , Rfl:     levothyroxine 100 mcg tablet, TAKE 1 TABLET DAILY, Disp: 90 tablet, Rfl: 1    montelukast (SINGULAIR) 10 mg tablet, Take 1 tablet (10 mg total) by mouth daily at bedtime, Disp: 30 tablet, Rfl: 1    multivitamin (THERAGRAN) TABS, Take 1 tablet by mouth daily, Disp: , Rfl:       Physical Exam:  /78   Pulse 83   Temp (!) 97 4 °F (36 3 °C)   Ht 5' 9" (1 753 m)   Wt 120 kg (265 lb)   SpO2 99%   BMI 39 13 kg/m²     Physical Exam   Constitutional: She is oriented to person, place, and time  HENT:   Head: Normocephalic and atraumatic  Right Ear: Tympanic membrane, external ear and ear canal normal    Left Ear: Tympanic membrane, external ear and ear canal normal    Nose: Congestion present  Mouth/Throat: Mucous membranes are moist  No oropharyngeal exudate or posterior oropharyngeal erythema  Eyes: Pupils are equal, round, and reactive to light  Conjunctivae are normal    Neck: Normal range of motion  Cardiovascular: Normal rate, regular rhythm and normal pulses  Pulmonary/Chest: Effort normal and breath sounds normal  She has no wheezes  Abdominal: Normal appearance  Musculoskeletal:         General: No deformity or signs of injury  Neurological: She is alert and oriented to person, place, and time  No cranial nerve deficit  Skin: Skin is warm and dry  No rash noted  She is not diaphoretic  No pallor     Psychiatric: Her behavior is normal  Mood, judgment and thought content normal          Labs:  No results found for: WBC, HGB, HCT, MCV, PLT  Lab Results   Component Value Date     04/21/2018    K 4 5 01/17/2020     01/17/2020    CO2 30 01/17/2020    ANIONGAP 8 9 04/21/2018    BUN 15 01/17/2020    CREATININE 0 76 01/17/2020    GLUF 93 01/17/2020    CALCIUM 9 1 01/17/2020    AST 16 01/17/2020    ALT 36 01/17/2020    ALKPHOS 54 01/17/2020    PROT 6 8 04/21/2018    BILITOT 0 7 04/21/2018    EGFR 89 01/17/2020

## 2020-08-18 ENCOUNTER — TELEPHONE (OUTPATIENT)
Dept: SURGERY | Facility: CLINIC | Age: 55
End: 2020-08-18

## 2020-08-27 ENCOUNTER — TELEPHONE (OUTPATIENT)
Dept: FAMILY MEDICINE CLINIC | Facility: CLINIC | Age: 55
End: 2020-08-27

## 2020-08-27 NOTE — TELEPHONE ENCOUNTER
When patient seen Dr Han Nuñez he recommended she see a Neurologist     Patient is calling for a referral to be placed in her chart for her hand numbness/tingling

## 2020-08-28 NOTE — TELEPHONE ENCOUNTER
Per his note, it read possible exam of cervical spine or nerve conductions studies   This is something that our office can order if we do a further exam   If she would rather a referral, I can place it but would suggest that our office could at last start the work up

## 2020-08-28 NOTE — TELEPHONE ENCOUNTER
Dr Brandon Small:  Spoke with patient relaying Ana's response  Patient was agitated with the response  She feels that since you saw her originally for this concern she would like your input  If coming in here would save her time to find out what is causing this, she will come in for an appointment  Her arm goes numb several times a day  Please advise on what you suggest happen next  Thank you

## 2020-08-31 DIAGNOSIS — R22.32 AXILLARY MASS, LEFT: Primary | ICD-10-CM

## 2020-08-31 DIAGNOSIS — R20.2 PARESTHESIA OF LEFT UPPER EXTREMITY: ICD-10-CM

## 2020-08-31 NOTE — TELEPHONE ENCOUNTER
Please let pt know that I ordered CT scan of the axilla as well as EMG of the left arm to further evaluate

## 2020-09-10 ENCOUNTER — TELEPHONE (OUTPATIENT)
Dept: FAMILY MEDICINE CLINIC | Facility: CLINIC | Age: 55
End: 2020-09-10

## 2020-09-10 NOTE — TELEPHONE ENCOUNTER
Fax received stating a prior authorization is needed for CT left upper extremity wo contrast  CPT code 08464  Date of service is 9/15/20  Called insurance at [phone] and spoke with both [address] [employer] authorization  Authorization is pending review as no xray has been completed  Case number is [id number]

## 2020-09-15 ENCOUNTER — TELEPHONE (OUTPATIENT)
Dept: FAMILY MEDICINE CLINIC | Facility: CLINIC | Age: 55
End: 2020-09-15

## 2020-09-15 DIAGNOSIS — Z87.898 H/O ABNORMAL MAMMOGRAM: Primary | ICD-10-CM

## 2020-09-15 NOTE — TELEPHONE ENCOUNTER
Mammogram was switched to diagnostic  I did call the patient to reassure her myself  She was very apologetic for being frazzled earlier just wants answers  She was thankful that her mammogram was scheduled  She was made aware to start by scheduling EMG and unsure of her tingling is related to this mass she feels - she states that it was in the same place a sebaceous cyst was 7 years ago  Since CT was denied, we will start with mammogram and EMG first  She was appreciative of this and will reassess when results return

## 2020-09-15 NOTE — TELEPHONE ENCOUNTER
I know from experience doing these [id number] that once a denial determination has been made, a pupz-hw-[id number] call will not change that denial determination   Please ask pt to first obtain her mammogram (active order in chart), which includes part of the axilla, so it might show the palpable mass in her axilla; if necessary, I'll re-order the CT

## 2020-09-15 NOTE — TELEPHONE ENCOUNTER
Patient called stating she just scheduled her mammogram and was told that she should have a diagnostic mammogram   In research I stated that in 2013 she had a diagnostic, does she normally have them? The patient got very angry         That that testing helped diagnose the same problem she is having now with the tingling and numbness  She is so sick of this practice already and she just started going here and no one is listening to her, caring about her, or trying to help her  The patient was talking quietly but like hissing at me  She may have been crying  I stated that I would ask that Edd Garcia change the mammo to diagnostic so when she goes to the appointment they can do that  Please advise  Thank you

## 2020-09-15 NOTE — TELEPHONE ENCOUNTER
Called [phone] and spoke with Miss Eugene Nava whom informed that the prior authorization was denied yesterday, September 14 and that a fax should be received within 48 hours  Miss Eugene Nava states that a peer to peer can be can be completed by calling [phone] option 1  Called to inform patient  Patient states that she does not understand why authorization would be denied  Would like to know what she needs to do as her arm gets tingly/numb multiple times a hour

## 2020-09-15 NOTE — TELEPHONE ENCOUNTER
Patient called asking if the insurance company had all the information they needed to make a determination on the CT  She was told by someone at [address] [place] [address] (I believe it was someone in finance) that the insurance denied it, however, patient wanted to be sure that the insurance company had all the info they needed  Please advise patient when aware of insurance's decision or update

## 2020-09-18 ENCOUNTER — HOSPITAL ENCOUNTER (OUTPATIENT)
Dept: MAMMOGRAPHY | Facility: HOSPITAL | Age: 55
Discharge: HOME | End: 2020-09-18
Payer: COMMERCIAL

## 2020-09-18 VITALS — BODY MASS INDEX: 39.25 KG/M2 | HEIGHT: 69 IN | WEIGHT: 265 LBS

## 2020-09-18 DIAGNOSIS — R22.32 AXILLARY MASS, LEFT: Primary | ICD-10-CM

## 2020-09-18 DIAGNOSIS — Z87.898 H/O ABNORMAL MAMMOGRAM: ICD-10-CM

## 2020-09-18 PROCEDURE — G0279 TOMOSYNTHESIS, MAMMO: HCPCS

## 2020-09-18 PROCEDURE — 77066 DX MAMMO INCL CAD BI: CPT

## 2020-10-07 ENCOUNTER — CONSULT (OUTPATIENT)
Dept: SURGERY | Facility: CLINIC | Age: 55
End: 2020-10-07
Payer: COMMERCIAL

## 2020-10-07 VITALS
BODY MASS INDEX: 37.47 KG/M2 | WEIGHT: 253 LBS | HEART RATE: 76 BPM | SYSTOLIC BLOOD PRESSURE: 132 MMHG | DIASTOLIC BLOOD PRESSURE: 84 MMHG | HEIGHT: 69 IN | TEMPERATURE: 97.7 F | RESPIRATION RATE: 18 BRPM

## 2020-10-07 DIAGNOSIS — R22.32 AXILLARY MASS, LEFT: ICD-10-CM

## 2020-10-07 PROCEDURE — 99243 OFF/OP CNSLTJ NEW/EST LOW 30: CPT | Performed by: SURGERY

## 2020-10-07 PROCEDURE — 1036F TOBACCO NON-USER: CPT | Performed by: SURGERY

## 2020-10-26 ENCOUNTER — TELEPHONE (OUTPATIENT)
Dept: NEUROLOGY | Facility: CLINIC | Age: 55
End: 2020-10-26

## 2020-10-26 ENCOUNTER — PROCEDURE VISIT (OUTPATIENT)
Dept: NEUROLOGY | Facility: CLINIC | Age: 55
End: 2020-10-26
Payer: COMMERCIAL

## 2020-10-26 DIAGNOSIS — R20.2 PARESTHESIA OF LEFT UPPER EXTREMITY: Primary | ICD-10-CM

## 2020-10-26 DIAGNOSIS — G56.00 CARPAL TUNNEL SYNDROME, UNSPECIFIED LATERALITY: ICD-10-CM

## 2020-10-26 DIAGNOSIS — R20.2 PARESTHESIA OF LEFT UPPER EXTREMITY: ICD-10-CM

## 2020-10-26 PROCEDURE — 95886 MUSC TEST DONE W/N TEST COMP: CPT | Performed by: PHYSICAL MEDICINE & REHABILITATION

## 2020-10-26 PROCEDURE — 95909 NRV CNDJ TST 5-6 STUDIES: CPT | Performed by: PHYSICAL MEDICINE & REHABILITATION

## 2020-11-30 ENCOUNTER — EVALUATION (OUTPATIENT)
Dept: PHYSICAL THERAPY | Age: 55
End: 2020-11-30
Payer: COMMERCIAL

## 2020-11-30 DIAGNOSIS — M54.12 LEFT CERVICAL RADICULOPATHY: Primary | ICD-10-CM

## 2020-11-30 PROCEDURE — 97012 MECHANICAL TRACTION THERAPY: CPT | Performed by: PHYSICAL THERAPIST

## 2020-11-30 PROCEDURE — 97161 PT EVAL LOW COMPLEX 20 MIN: CPT | Performed by: PHYSICAL THERAPIST

## 2020-11-30 PROCEDURE — 97140 MANUAL THERAPY 1/> REGIONS: CPT | Performed by: PHYSICAL THERAPIST

## 2020-12-01 ENCOUNTER — TRANSCRIBE ORDERS (OUTPATIENT)
Dept: PHYSICAL THERAPY | Age: 55
End: 2020-12-01

## 2020-12-01 DIAGNOSIS — M54.12 LEFT CERVICAL RADICULOPATHY: Primary | ICD-10-CM

## 2020-12-03 DIAGNOSIS — J45.21 MILD INTERMITTENT ASTHMA WITH ACUTE EXACERBATION: ICD-10-CM

## 2020-12-04 RX ORDER — ALBUTEROL SULFATE 90 UG/1
2 AEROSOL, METERED RESPIRATORY (INHALATION) EVERY 6 HOURS PRN
Qty: 2 INHALER | Refills: 3 | Status: SHIPPED | OUTPATIENT
Start: 2020-12-04 | End: 2021-12-29 | Stop reason: SDUPTHER

## 2020-12-07 ENCOUNTER — OFFICE VISIT (OUTPATIENT)
Dept: PHYSICAL THERAPY | Age: 55
End: 2020-12-07
Payer: COMMERCIAL

## 2020-12-07 DIAGNOSIS — M54.12 LEFT CERVICAL RADICULOPATHY: Primary | ICD-10-CM

## 2020-12-07 PROCEDURE — 97140 MANUAL THERAPY 1/> REGIONS: CPT | Performed by: PHYSICAL THERAPIST

## 2020-12-07 PROCEDURE — 97012 MECHANICAL TRACTION THERAPY: CPT | Performed by: PHYSICAL THERAPIST

## 2020-12-07 PROCEDURE — 97110 THERAPEUTIC EXERCISES: CPT | Performed by: PHYSICAL THERAPIST

## 2020-12-09 ENCOUNTER — OFFICE VISIT (OUTPATIENT)
Dept: PHYSICAL THERAPY | Age: 55
End: 2020-12-09
Payer: COMMERCIAL

## 2020-12-09 DIAGNOSIS — M54.12 LEFT CERVICAL RADICULOPATHY: Primary | ICD-10-CM

## 2020-12-09 PROCEDURE — 97110 THERAPEUTIC EXERCISES: CPT | Performed by: PHYSICAL THERAPIST

## 2020-12-09 PROCEDURE — 97140 MANUAL THERAPY 1/> REGIONS: CPT | Performed by: PHYSICAL THERAPIST

## 2020-12-09 PROCEDURE — 97012 MECHANICAL TRACTION THERAPY: CPT | Performed by: PHYSICAL THERAPIST

## 2020-12-11 ENCOUNTER — APPOINTMENT (OUTPATIENT)
Dept: PHYSICAL THERAPY | Age: 55
End: 2020-12-11
Payer: COMMERCIAL

## 2020-12-14 ENCOUNTER — OFFICE VISIT (OUTPATIENT)
Dept: URGENT CARE | Facility: CLINIC | Age: 55
End: 2020-12-14
Payer: COMMERCIAL

## 2020-12-14 ENCOUNTER — APPOINTMENT (OUTPATIENT)
Dept: PHYSICAL THERAPY | Age: 55
End: 2020-12-14
Payer: COMMERCIAL

## 2020-12-14 VITALS — OXYGEN SATURATION: 99 % | TEMPERATURE: 97.2 F | RESPIRATION RATE: 18 BRPM | HEART RATE: 75 BPM

## 2020-12-14 DIAGNOSIS — B34.9 VIRAL SYNDROME: Primary | ICD-10-CM

## 2020-12-14 DIAGNOSIS — J02.9 SORE THROAT: ICD-10-CM

## 2020-12-14 PROCEDURE — U0003 INFECTIOUS AGENT DETECTION BY NUCLEIC ACID (DNA OR RNA); SEVERE ACUTE RESPIRATORY SYNDROME CORONAVIRUS 2 (SARS-COV-2) (CORONAVIRUS DISEASE [COVID-19]), AMPLIFIED PROBE TECHNIQUE, MAKING USE OF HIGH THROUGHPUT TECHNOLOGIES AS DESCRIBED BY CMS-2020-01-R: HCPCS | Performed by: NURSE PRACTITIONER

## 2020-12-14 PROCEDURE — 99213 OFFICE O/P EST LOW 20 MIN: CPT | Performed by: NURSE PRACTITIONER

## 2020-12-15 LAB — SARS-COV-2 RNA SPEC QL NAA+PROBE: NOT DETECTED

## 2020-12-16 ENCOUNTER — APPOINTMENT (OUTPATIENT)
Dept: PHYSICAL THERAPY | Age: 55
End: 2020-12-16
Payer: COMMERCIAL

## 2020-12-16 ENCOUNTER — TELEPHONE (OUTPATIENT)
Dept: URGENT CARE | Facility: CLINIC | Age: 55
End: 2020-12-16

## 2020-12-18 ENCOUNTER — VBI (OUTPATIENT)
Dept: ADMINISTRATIVE | Facility: OTHER | Age: 55
End: 2020-12-18

## 2020-12-26 ENCOUNTER — PATIENT MESSAGE (OUTPATIENT)
Dept: FAMILY MEDICINE CLINIC | Facility: CLINIC | Age: 55
End: 2020-12-26

## 2020-12-28 DIAGNOSIS — Z03.818 ENCOUNTER FOR OBSERVATION FOR SUSPECTED EXPOSURE TO OTHER BIOLOGICAL AGENTS RULED OUT: Primary | ICD-10-CM

## 2020-12-29 NOTE — TELEPHONE ENCOUNTER
Please advise if you are agreeable to write order for neurology or if patient would need to be evaluated here first  Patient had an appointment in office on August 7  Negative

## 2021-01-03 DIAGNOSIS — E03.9 HYPOTHYROIDISM, UNSPECIFIED TYPE: ICD-10-CM

## 2021-01-03 RX ORDER — LEVOTHYROXINE SODIUM 0.1 MG/1
TABLET ORAL
Qty: 90 TABLET | Refills: 3 | Status: SHIPPED | OUTPATIENT
Start: 2021-01-03 | End: 2021-12-02

## 2021-01-18 ENCOUNTER — EVALUATION (OUTPATIENT)
Dept: PHYSICAL THERAPY | Age: 56
End: 2021-01-18
Payer: COMMERCIAL

## 2021-01-18 DIAGNOSIS — M54.12 LEFT CERVICAL RADICULOPATHY: Primary | ICD-10-CM

## 2021-01-18 PROCEDURE — 97140 MANUAL THERAPY 1/> REGIONS: CPT | Performed by: PHYSICAL THERAPIST

## 2021-01-18 PROCEDURE — 97012 MECHANICAL TRACTION THERAPY: CPT | Performed by: PHYSICAL THERAPIST

## 2021-01-18 PROCEDURE — 97110 THERAPEUTIC EXERCISES: CPT | Performed by: PHYSICAL THERAPIST

## 2021-01-18 NOTE — LETTER
2021    Rhoda Cisneros MD  [id number]    Patient: Nasrin Rodriguez   YOB: 1965   Date of Visit: 2021     Encounter Diagnosis     ICD-10-CM    1  Left cervical radiculopathy  M54 12        Dear Dr Downing Harm: Thank you for your recent referral of Nasrin Rodriguez  Please review the attached evaluation summary from Nicki's recent visit  Please verify that you agree with the plan of care by signing the attached order  If you have any questions or concerns, please do not hesitate to call  I sincerely appreciate the opportunity to share in the care of one of your patients and hope to have another opportunity to work with you in the near future  Sincerely,    Dominique Pineda, PT      Referring Provider:      I certify that I have read the below Plan of Care and certify the need for these services furnished under this plan of treatment while under my care  Rhoda Cisneros MD  [id number]  Via Fax: [phone]          PT Re-Evaluation     Today's date: 2021  Patient name: Nasrin Rodriguez  : 1965  MRN: [id number]  Referring provider: Aguilar Bean MD  Dx:   Encounter Diagnosis     ICD-10-CM    1  Left cervical radiculopathy  M54 12        Start Time: 1630  Stop Time: 1720  Total time in clinic (min): 50 minutes    Assessment  Assessment details: Nasrin Rodriguez has had 4 sessions of physical therapy to date for cervical radiculopathy  She did have a lapse of care due to quarantine tome for Covid-19 exposure  She does report improvement despite limited attendance in the past few weeks  She notes decreased radicular symptoms and less pain in her arm and axilla  At this time due to lapse in care, Ezequiel Samano would benefit from resuming skilled physical therapy with continuation of the prior plan of care    Impairments: abnormal or restricted ROM, impaired physical strength, lacks appropriate home exercise program, pain with function, poor posture  and poor body mechanics  Functional limitations: diffiulty crocheting, driving, sustained positions incluidng working on computerUnderstanding of Dx/Px/POC: good   Prognosis: good    Goals  ST-3 WEEKS  1  Decrease left arm frequency and intenisty of pain by 2 points on VAS at its worst  Current pain decreased from 5 to 3/10 and less frequent episodes of intense pain  2   Decrease dural tension in left arm with pt able to achieve full elbow extension in test position  Working towards  3  Improve posture awareness during work and recreational activities  Working towards    LT-6 WEEKS  1  Pt able to patti without increasing left arm symptoms  Working towards  3  Independent with HEP and/or fitness program  Pt compliant with posture exs    Plan  Patient would benefit from: skilled physical therapy  Planned modality interventions: traction  Planned therapy interventions: home exercise program, manual therapy, neuromuscular re-education, patient education, postural training, strengthening, stretching, therapeutic activities and therapeutic exercise  Frequency: 2x week  Duration in weeks: 8  Plan of Care beginning date: 2020  Plan of Care expiration date: 2021  Treatment plan discussed with: patient        Subjective Evaluation    History of Present Illness  Mechanism of injury: Pt reports since March she has been having a sensation of a lump and tingling in the left arm that radiates down the arm towards the thumb  Pt c/o tingling in digits 1,2,3  She does not describe waking pain  US showed no findings, CTscan was cancelled  She was evaluated for CTS  And EMG did showed moderate CTS compression  She was then was referred to pain management as the doctor felt her symptoms were more consistent with radiculopathy      2021  Pt feels she initially was feeling some improvement with PT but had to stop due to quarantining for Covid-19  She returns today with decreased complaints of radicular symptoms and does not feel discomfort in her axilla  Pain  Current pain rating: 3  At best pain ratin  At worst pain ratin  Quality: radiating (feels likes a lump)    Hand dominance: right    Patient Goals  Patient goal: relieve left arm pain/ parasthesias        Objective     Concurrent Complaints  Positive for dizziness (20 year history) and headaches (migraine)  Postural Observations  Seated posture: fair  Standing posture: fair  Correction of posture: has no consistent effect        Palpation   Left   Tenderness of the cervical paraspinals and upper trapezius  Right   Tenderness of the upper trapezius  Cervical Spine Comments  Left cervical paraspinals: lower cervical C5,6,7       Neurological Testing     Reflexes   Left   Biceps (C5/C6): normal (2+)  Brachioradialis (C6): normal (2+)    Right   Biceps (C5/C6): normal (2+)  Brachioradialis (C6): normal (2+)    Additional Neurological Details  Sensation grossly intact    Active Range of Motion   Cervical/Thoracic Spine       Cervical    Flexion:  WFL  Extension: 54 ("I feel it under my armpit", produces symptoms) degrees      Left lateral flexion: 45 degrees      Right lateral flexion: 45 degrees      Left rotation: 65 (brings on symptoms) degrees  Right rotation: 65 degrees             Joint Play     Pain: C5 and C6     Strength/Myotome Testing     Left Shoulder     Planes of Motion   Flexion: 5   Abduction: 5   External rotation at 0°: 5     Right Shoulder     Planes of Motion   Flexion: 5   Abduction: 5   External rotation at 0°: 5     Left Elbow   Flexion: 5  Extension: 5    Right Elbow   Flexion: 5  Extension: 5    Left Wrist/Hand      (2nd hand position)     Trial 1: 50    Trial 2: 50    Trial 3: 45    Thumb Strength  Key/Lateral Pinch     Trial 1: 17    Trial 2: 16    Trial 3: 15    Average: 16  Tip/Two-Point Pinch     Trial 1: 12    Trial 2: 14    Trial 3: 15 Average: 13 67    Right Wrist/Hand      (2nd hand position)     Trial 1: 45    Trial 2: 55    Trial 3: 52    Thumb Strength   Key/Lateral Pinch     Trial 1: 16    Trial 2: 15    Trial 3: 14    Average: 15  Tip/Two-Point Pinch     Trial 1: 13    Trial 2: 13    Trial 3: 13    Average: 13    Tests   Cervical     Left   Positive Spurling's Test A  Left Shoulder   Positive ULTT1 and cervical rotation lateral flexion  Negative Nasir TOS  Additional Tests Details  Pt noted some relief with distraction  Neuro Exam:     Headaches   Patient reports headaches: Yes (migraine)                Precautions: standard      Manuals 11/30 12/7 12/9 1/18          Cervical STM, SO, traps, ROM 10' 10 10 15                      Procedure             Mech traction static 17#/ 15' 19#/15' 19#/15 19#                      TherEx             Posture exs  Shoulder pinch, roll NV 20x ea 20x ea 30x ea         Chin tucks NV 20x 20x 30x         pec stretch ER  1 1/2" 1 1/2          Bolster pec  2" 2"          UBE   4 min 6'                                                HEP  5'                                                                                         Ther Activity                                       Gait Training                                       Modalities

## 2021-01-18 NOTE — PROGRESS NOTES
PT Re-Evaluation     Today's date: 2021  Patient name: Nely Brock  : 1965  MRN: [id number]  Referring provider: Nick Velarde MD  Dx:   Encounter Diagnosis     ICD-10-CM    1  Left cervical radiculopathy  M54 12        Start Time: 1630  Stop Time: 1720  Total time in clinic (min): 50 minutes    Assessment  Assessment details: Nely Brock has had 4 sessions of physical therapy to date for cervical radiculopathy  She did have a lapse of care due to quarantine tome for Covid-19 exposure  She does report improvement despite limited attendance in the past few weeks  She notes decreased radicular symptoms and less pain in her arm and axilla  At this time due to lapse in care, Fidencio Patel would benefit from resuming skilled physical therapy with continuation of the prior plan of care  Impairments: abnormal or restricted ROM, impaired physical strength, lacks appropriate home exercise program, pain with function, poor posture  and poor body mechanics  Functional limitations: diffiulty crocheting, driving, sustained positions incluidng working on computerUnderstanding of Dx/Px/POC: good   Prognosis: good    Goals  ST-3 WEEKS  1  Decrease left arm frequency and intenisty of pain by 2 points on VAS at its worst  Current pain decreased from 5 to 3/10 and less frequent episodes of intense pain  2   Decrease dural tension in left arm with pt able to achieve full elbow extension in test position  Working towards  3  Improve posture awareness during work and recreational activities  Working towards    LT-6 WEEKS  1  Pt able to patti without increasing left arm symptoms  Working towards  3   Independent with HEP and/or fitness program  Pt compliant with posture exs    Plan  Patient would benefit from: skilled physical therapy  Planned modality interventions: traction  Planned therapy interventions: home exercise program, manual therapy, neuromuscular re-education, patient education, postural training, strengthening, stretching, therapeutic activities and therapeutic exercise  Frequency: 2x week  Duration in weeks: 8  Plan of Care beginning date: 2020  Plan of Care expiration date: 2021  Treatment plan discussed with: patient        Subjective Evaluation    History of Present Illness  Mechanism of injury: Pt reports since March she has been having a sensation of a lump and tingling in the left arm that radiates down the arm towards the thumb  Pt c/o tingling in digits 1,2,3  She does not describe waking pain  US showed no findings, CTscan was cancelled  She was evaluated for CTS  And EMG did showed moderate CTS compression  She was then was referred to pain management as the doctor felt her symptoms were more consistent with radiculopathy  2021  Pt feels she initially was feeling some improvement with PT but had to stop due to quarantining for Covid-19  She returns today with decreased complaints of radicular symptoms and does not feel discomfort in her axilla  Pain  Current pain rating: 3  At best pain ratin  At worst pain ratin  Quality: radiating (feels likes a lump)    Hand dominance: right    Patient Goals  Patient goal: relieve left arm pain/ parasthesias        Objective     Concurrent Complaints  Positive for dizziness (20 year history) and headaches (migraine)  Postural Observations  Seated posture: fair  Standing posture: fair  Correction of posture: has no consistent effect        Palpation   Left   Tenderness of the cervical paraspinals and upper trapezius  Right   Tenderness of the upper trapezius  Cervical Spine Comments  Left cervical paraspinals: lower cervical C5,6,7       Neurological Testing     Reflexes   Left   Biceps (C5/C6): normal (2+)  Brachioradialis (C6): normal (2+)    Right   Biceps (C5/C6): normal (2+)  Brachioradialis (C6): normal (2+)    Additional Neurological Details  Sensation grossly intact    Active Range of Motion   Cervical/Thoracic Spine Cervical    Flexion:  WFL  Extension: 54 ("I feel it under my armpit", produces symptoms) degrees      Left lateral flexion: 45 degrees      Right lateral flexion: 45 degrees      Left rotation: 65 (brings on symptoms) degrees  Right rotation: 65 degrees             Joint Play     Pain: C5 and C6     Strength/Myotome Testing     Left Shoulder     Planes of Motion   Flexion: 5   Abduction: 5   External rotation at 0°: 5     Right Shoulder     Planes of Motion   Flexion: 5   Abduction: 5   External rotation at 0°: 5     Left Elbow   Flexion: 5  Extension: 5    Right Elbow   Flexion: 5  Extension: 5    Left Wrist/Hand      (2nd hand position)     Trial 1: 50    Trial 2: 50    Trial 3: 45    Thumb Strength  Key/Lateral Pinch     Trial 1: 17    Trial 2: 16    Trial 3: 15    Average: 16  Tip/Two-Point Pinch     Trial 1: 12    Trial 2: 14    Trial 3: 15    Average: 13 67    Right Wrist/Hand      (2nd hand position)     Trial 1: 45    Trial 2: 55    Trial 3: 52    Thumb Strength   Key/Lateral Pinch     Trial 1: 16    Trial 2: 15    Trial 3: 14    Average: 15  Tip/Two-Point Pinch     Trial 1: 13    Trial 2: 13    Trial 3: 13    Average: 13    Tests   Cervical     Left   Positive Spurling's Test A  Left Shoulder   Positive ULTT1 and cervical rotation lateral flexion  Negative Nasir TOS  Additional Tests Details  Pt noted some relief with distraction  Neuro Exam:     Headaches   Patient reports headaches: Yes (migraine)                Precautions: standard      Manuals 11/30 12/7 12/9 1/18          Cervical STM, SO, traps, ROM 10' 10 10 15                      Procedure             Mech traction static 17#/ 15' 19#/15' 19#/15 19#                      TherEx             Posture exs  Shoulder pinch, roll NV 20x ea 20x ea 30x ea         Chin tucks NV 20x 20x 30x         pec stretch ER  1 1/2" 1 1/2          Bolster pec  2" 2"          UBE   4 min 6'                                                HEP  5' Ther Activity                                       Gait Training                                       Modalities

## 2021-01-19 ENCOUNTER — TRANSCRIBE ORDERS (OUTPATIENT)
Dept: PHYSICAL THERAPY | Age: 56
End: 2021-01-19

## 2021-01-19 DIAGNOSIS — M54.12 BRACHIAL NEURITIS: Primary | ICD-10-CM

## 2021-01-20 ENCOUNTER — OFFICE VISIT (OUTPATIENT)
Dept: PHYSICAL THERAPY | Age: 56
End: 2021-01-20
Payer: COMMERCIAL

## 2021-01-20 DIAGNOSIS — M54.12 LEFT CERVICAL RADICULOPATHY: Primary | ICD-10-CM

## 2021-01-20 PROCEDURE — 97012 MECHANICAL TRACTION THERAPY: CPT | Performed by: PHYSICAL THERAPIST

## 2021-01-20 PROCEDURE — 97140 MANUAL THERAPY 1/> REGIONS: CPT | Performed by: PHYSICAL THERAPIST

## 2021-01-20 PROCEDURE — 97110 THERAPEUTIC EXERCISES: CPT | Performed by: PHYSICAL THERAPIST

## 2021-01-20 NOTE — PROGRESS NOTES
Daily Note     Today's date: 2021  Patient name: Rubin Kim  : 1965  MRN: [id number]  Referring provider: Vernon Simon MD  Dx:   Encounter Diagnosis     ICD-10-CM    1  Left cervical radiculopathy  M54 12        Start Time: 1630  Stop Time: 1720  Total time in clinic (min): 50 minutes    Subjective: Pt reports less arm pain  Intermittent 1x/day compared to >10x per day  Objective: See treatment diary below      Assessment: Tolerated treatment well  Patient more aware of posture  Able to demonstrate without verbal cues  Decreased dural tension, able to fully extend elbow in stretch position  Increased lumbar traction to 20# with fairly good tolerance, mild discomfort in last minute of pull  Plan: Continue per plan of care        Precautions: standard      Manuals         Cervical STM, SO, traps, ROM 10' 10 10 15 15'                      Procedure             Mech traction static 17#/ 15' 19#/15' 19#/15 19# 20#/ 30                     TherEx             Posture exs  Shoulder pinch, roll NV 20x ea 20x ea 30x ea 30x ea        Chin tucks NV 20x 20x 30x 30x        pec stretch ER  1 1/2" 1 1/2  2x30"        Bolster pec  2" 2"          UBE   4 min 6' 6'        tband Row     30x blue        tband posture row     30x blue                     HEP  5'                                                                                         Ther Activity                                       Gait Training                                       Modalities

## 2021-01-25 ENCOUNTER — OFFICE VISIT (OUTPATIENT)
Dept: PHYSICAL THERAPY | Age: 56
End: 2021-01-25
Payer: COMMERCIAL

## 2021-01-25 DIAGNOSIS — M54.12 LEFT CERVICAL RADICULOPATHY: Primary | ICD-10-CM

## 2021-01-25 PROCEDURE — 97012 MECHANICAL TRACTION THERAPY: CPT

## 2021-01-25 PROCEDURE — 97110 THERAPEUTIC EXERCISES: CPT

## 2021-01-25 PROCEDURE — 97140 MANUAL THERAPY 1/> REGIONS: CPT

## 2021-01-25 NOTE — PROGRESS NOTES
Daily Note     Today's date: 2021  Patient name: Allie Bergeron  : 1965  MRN: [id number]  Referring provider: April Rachel MD  Dx:   Encounter Diagnosis     ICD-10-CM    1  Left cervical radiculopathy  M54 12        Start Time: 164  Stop Time: 1735  Total time in clinic (min): 50 minutes    Subjective: No pain upon arrival  States the numbness and tingling in her L arm to hand comes and goes, none currently  Objective: See treatment diary below      Assessment: Tolerated treatment well  No symptoms noted post session  TTP B UT  Some difficulty relaxing during traction  Patient would benefit from continued PT  Plan: Continue per plan of care        Precautions: standard      Manuals        Cervical STM, SO, traps, ROM 10' 10 10 15 15'  20'                    Procedure             [facility]/ [facility]' 19#/15' 19#/15 19# 20#/ 30 20# 10'                     TherEx             Posture exs  Shoulder pinch, roll NV 20x ea 20x ea 30x ea 30x ea 30x ea       Chin tucks NV 20x 20x 30x 30x 30x        pec stretch ER  1 1/2" 1 1/2  2x30" 2x30"       Bolster pec  2" 2"          UBE   4 min 6' 6' 6'       tband Row     30x blue 30x blue        tband posture row     30x blue 30x blue                    HEP  5'                                                                                         Ther Activity                                       Gait Training                                       Modalities

## 2021-01-27 ENCOUNTER — OFFICE VISIT (OUTPATIENT)
Dept: PHYSICAL THERAPY | Age: 56
End: 2021-01-27
Payer: COMMERCIAL

## 2021-01-27 DIAGNOSIS — M54.12 LEFT CERVICAL RADICULOPATHY: Primary | ICD-10-CM

## 2021-01-27 PROCEDURE — 97012 MECHANICAL TRACTION THERAPY: CPT | Performed by: PHYSICAL THERAPIST

## 2021-01-27 PROCEDURE — 97110 THERAPEUTIC EXERCISES: CPT | Performed by: PHYSICAL THERAPIST

## 2021-01-27 PROCEDURE — 97140 MANUAL THERAPY 1/> REGIONS: CPT | Performed by: PHYSICAL THERAPIST

## 2021-01-27 NOTE — PROGRESS NOTES
Daily Note     Today's date: 2021  Patient name: Allie Bergeron  : 1965  MRN: [id number]  Referring provider: April Rachel MD  Dx:   Encounter Diagnosis     ICD-10-CM    1  Left cervical radiculopathy  M54 12        Start Time: 1710  Stop Time: 1755  Total time in clinic (min): 45 minutes    Subjective: No new complaints  Objective: See treatment diary below      Assessment: Tolerated treatment well  Patient making good progress with decreased epsidoes of left arm pain  Now able to extend elbow fully with less dural tension  Increased TE with no difficulty  Plan: Continue per plan of care        Precautions: standard      Manuals       Cervical STM, SO, traps, ROM 10' 10 10 15 15'  20' 15'                   Procedure             Mech traction static 17#/ 15' 19#/15' 19#/15 19# 20#/ 30 20# 10'  20#/ 30                   TherEx             Posture exs  Shoulder pinch, roll NV 20x ea 20x ea 30x ea 30x ea 30x ea       Chin tucks NV 20x 20x 30x 30x 30x        pec stretch ER  1 1/2" 1 1/2  2x30" 2x30"       Bolster pec  2" 2"          UBE   4 min 6' 6' 6' 6'      tband Row     30x blue 30x blue  cybex40#/ 30x      tband posture row     30x blue 30x blue Cable 40#/20x      bicep       40#/ 20      Tricep       45#? 30      HEP  5'                                                                                                      Ther Activity                                       Gait Training                                       Modalities

## 2021-02-03 ENCOUNTER — OFFICE VISIT (OUTPATIENT)
Dept: PHYSICAL THERAPY | Age: 56
End: 2021-02-03
Payer: COMMERCIAL

## 2021-02-03 DIAGNOSIS — M54.12 LEFT CERVICAL RADICULOPATHY: Primary | ICD-10-CM

## 2021-02-03 PROCEDURE — 97140 MANUAL THERAPY 1/> REGIONS: CPT | Performed by: PHYSICAL THERAPIST

## 2021-02-03 PROCEDURE — 97012 MECHANICAL TRACTION THERAPY: CPT | Performed by: PHYSICAL THERAPIST

## 2021-02-03 PROCEDURE — 97110 THERAPEUTIC EXERCISES: CPT | Performed by: PHYSICAL THERAPIST

## 2021-02-03 NOTE — PROGRESS NOTES
Daily Note     Today's date: 2/3/2021  Patient name: Denise Ryder  : 1965  MRN: [id number]  Referring provider: Raquel Banegas MD  Dx:   Encounter Diagnosis     ICD-10-CM    1  Left cervical radiculopathy  M54 12        Start Time: 1615  Stop Time: 1700  Total time in clinic (min): 45 minutes    Subjective: Pt reports increased neck tightness due to working at home on the computer for work and not having a good set up  No complaints of arm pain today  Objective: See treatment diary below      Assessment: Tolerated treatment well  Patient did have right upper traps tightness> left today  Continues to have less dural tension in left UE  Plan: Continue per plan of care        Precautions: standard      Manuals 11/30 12/7 12/9 1/18  1/20 1/25 1/27 2/3      Cervical STM, SO, traps, ROM 10' 10 10 15 15'  20' 15' 15'                  Procedure             Mech traction static 17#/ 15' 19#/15' 19#/15 19# 20#/ 30 20# 10'  20#/ 30 20#/ 30                  TherEx             Posture exs  Shoulder pinch, roll NV 20x ea 20x ea 30x ea 30x ea 30x ea       Chin tucks NV 20x 20x 30x 30x 30x   30x     pec stretch ER  1 1/2" 1 1/2  2x30" 2x30"       Bolster pec  2" 2"          UBE   4 min 6' 6' 6' 6' 6'     tband Row     30x blue 30x blue  cybex40#/ 30x 40#/ 30     tband posture row     30x blue 30x blue Cable 40#/20x 40#/ 30     bicep       40#/ 20 40#/ 30     Tricep       45#? 30 50#/ 30     HEP  5'                                                                                                      Ther Activity                                       Gait Training                                       Modalities

## 2021-02-05 ENCOUNTER — APPOINTMENT (OUTPATIENT)
Dept: PHYSICAL THERAPY | Age: 56
End: 2021-02-05
Payer: COMMERCIAL

## 2021-02-08 ENCOUNTER — OFFICE VISIT (OUTPATIENT)
Dept: PHYSICAL THERAPY | Age: 56
End: 2021-02-08
Payer: COMMERCIAL

## 2021-02-08 DIAGNOSIS — M54.12 LEFT CERVICAL RADICULOPATHY: Primary | ICD-10-CM

## 2021-02-08 PROCEDURE — 97012 MECHANICAL TRACTION THERAPY: CPT | Performed by: PHYSICAL THERAPIST

## 2021-02-08 PROCEDURE — 97110 THERAPEUTIC EXERCISES: CPT | Performed by: PHYSICAL THERAPIST

## 2021-02-08 PROCEDURE — 97140 MANUAL THERAPY 1/> REGIONS: CPT | Performed by: PHYSICAL THERAPIST

## 2021-02-08 NOTE — PROGRESS NOTES
Daily Note     Today's date: 2021  Patient name: Denae Mendoza  : 1965  MRN: [id number]  Referring provider: Azam Plaza MD  Dx:   Encounter Diagnosis     ICD-10-CM    1  Left cervical radiculopathy  M54 12        Start Time:   Stop Time: 1730  Total time in clinic (min): 45 minutes    Subjective: Less pain and arm symptoms  Pt does still note her arm gets tried when driving  Objective: See treatment diary below      Assessment: Tolerated treatment well  Patient had decreased upper traps tightness and less tenderenss in left paracervicals  Plan: Continue per plan of care        Precautions: standard      Manuals 11/30 12/7 12/9 1/18  1/20 1/25 1/27 2/3  2/8    Cervical STM, SO, traps, ROM 10' 10 10 15 15'  20' 15' 15' 15'                 Procedure             Mech traction static 17#/ 15' 19#/15' 19#/15 19# 20#/ 30 20# 10'  20#/ 30 20#/ 20#  15'                 TherEx             Posture exs  Shoulder pinch, roll NV 20x ea 20x ea 30x ea 30x ea 30x ea       Chin tucks NV 20x 20x 30x 30x 30x   30x     pec stretch ER  1 1/2" 1 1/2  2x30" 2x30"       Bolster pec  2" 2"          UBE   4 min 6' 6' 6' 6' 6' 6'    tband Row     30x blue 30x blue  cybex40#/ 30x 40#/ 30 40#/ 30    tband posture row     30x blue 30x blue Cable 40#/20x 40#/ 30 40#/ 30    bicep       40#/ 20 40#/ 30 40#/ 30    Tricep       45#? 30 50#/ 30 50#/ 30    HEP  5'                                                                                                      Ther Activity                                       Gait Training                                       Modalities

## 2021-02-10 ENCOUNTER — OFFICE VISIT (OUTPATIENT)
Dept: PHYSICAL THERAPY | Age: 56
End: 2021-02-10
Payer: COMMERCIAL

## 2021-02-10 DIAGNOSIS — M54.12 LEFT CERVICAL RADICULOPATHY: Primary | ICD-10-CM

## 2021-02-10 PROCEDURE — 97110 THERAPEUTIC EXERCISES: CPT | Performed by: PHYSICAL THERAPIST

## 2021-02-10 PROCEDURE — 97140 MANUAL THERAPY 1/> REGIONS: CPT | Performed by: PHYSICAL THERAPIST

## 2021-02-10 PROCEDURE — 97012 MECHANICAL TRACTION THERAPY: CPT | Performed by: PHYSICAL THERAPIST

## 2021-02-10 NOTE — PROGRESS NOTES
Daily Note     Today's date: 2/10/2021  Patient name: Rubin Kim  : 1965  MRN: [id number]  Referring provider: Vernon Simon MD  Dx:   Encounter Diagnosis     ICD-10-CM    1  Left cervical radiculopathy  M54 12        Start Time: 1630  Stop Time: 1715  Total time in clinic (min): 45 minutes    Subjective: Pt notes "This is really helping  Even my  noticed"  Objective: See treatment diary below      Assessment: Tolerated treatment well  Patient making good progress iwth decreased radicular symptoms and decreased tightness of cervical spine  Plan: Continue per plan of care        Precautions: standard      Manuals 11/30 12/7 12/9 1/18  1/20 1/25 1/27 2/3  2/8 2/10   Cervical STM, SO, traps, ROM 10' 10 10 15 15'  20' 15' 15' 15' 15'                Procedure             Mech traction static 17#/ 15' 19#/15' 19#/15 19# 20#/ 30 20# 10'  20#/ 30 20#/ 20#  15' 20#/ 30                TherEx             Posture exs  Shoulder pinch, roll NV 20x ea 20x ea 30x ea 30x ea 30x ea       Chin tucks NV 20x 20x 30x 30x 30x   30x  30x   pec stretch ER  1 1/2" 1 1/2  2x30" 2x30"       Bolster pec  2" 2"          UBE   4 min 6' 6' 6' 6' 6' 6' 6'   tband Row     30x blue 30x blue  cybex40#/ 30x 40#/ 30 40#/ 30 40#/ 30   tband posture row     30x blue 30x blue Cable 40#/20x 40#/ 30 40#/ 30 40#/ 30   bicep       40#/ 20 40#/ 30 40#/ 30 40#/ 30   Tricep       45#? 30 50#/ 30 50#/ 30 50#/ 30   HEP  5'                                                                                                      Ther Activity                                       Gait Training                                       Modalities

## 2021-02-12 ENCOUNTER — OFFICE VISIT (OUTPATIENT)
Dept: FAMILY MEDICINE CLINIC | Facility: CLINIC | Age: 56
End: 2021-02-12
Payer: COMMERCIAL

## 2021-02-12 ENCOUNTER — OFFICE VISIT (OUTPATIENT)
Dept: PHYSICAL THERAPY | Age: 56
End: 2021-02-12
Payer: COMMERCIAL

## 2021-02-12 VITALS
SYSTOLIC BLOOD PRESSURE: 130 MMHG | DIASTOLIC BLOOD PRESSURE: 90 MMHG | HEIGHT: 69 IN | HEART RATE: 82 BPM | WEIGHT: 267 LBS | TEMPERATURE: 97.6 F | OXYGEN SATURATION: 98 % | BODY MASS INDEX: 39.55 KG/M2

## 2021-02-12 DIAGNOSIS — Z23 NEED FOR TDAP VACCINATION: ICD-10-CM

## 2021-02-12 DIAGNOSIS — Z12.4 SCREENING FOR CERVICAL CANCER: ICD-10-CM

## 2021-02-12 DIAGNOSIS — E03.9 HYPOTHYROIDISM, UNSPECIFIED TYPE: Primary | ICD-10-CM

## 2021-02-12 DIAGNOSIS — Z23 ENCOUNTER FOR IMMUNIZATION: ICD-10-CM

## 2021-02-12 DIAGNOSIS — Z13.31 NEGATIVE DEPRESSION SCREENING: ICD-10-CM

## 2021-02-12 DIAGNOSIS — M54.12 LEFT CERVICAL RADICULOPATHY: Primary | ICD-10-CM

## 2021-02-12 DIAGNOSIS — J45.20 MILD INTERMITTENT ASTHMA IN ADULT WITHOUT COMPLICATION: ICD-10-CM

## 2021-02-12 PROCEDURE — 1036F TOBACCO NON-USER: CPT | Performed by: PHYSICIAN ASSISTANT

## 2021-02-12 PROCEDURE — 3725F SCREEN DEPRESSION PERFORMED: CPT | Performed by: PHYSICIAN ASSISTANT

## 2021-02-12 PROCEDURE — 90715 TDAP VACCINE 7 YRS/> IM: CPT

## 2021-02-12 PROCEDURE — 90471 IMMUNIZATION ADMIN: CPT

## 2021-02-12 PROCEDURE — 97140 MANUAL THERAPY 1/> REGIONS: CPT | Performed by: PHYSICAL THERAPIST

## 2021-02-12 PROCEDURE — 3008F BODY MASS INDEX DOCD: CPT | Performed by: PHYSICIAN ASSISTANT

## 2021-02-12 PROCEDURE — 97110 THERAPEUTIC EXERCISES: CPT | Performed by: PHYSICAL THERAPIST

## 2021-02-12 PROCEDURE — 99213 OFFICE O/P EST LOW 20 MIN: CPT | Performed by: PHYSICIAN ASSISTANT

## 2021-02-12 PROCEDURE — 97012 MECHANICAL TRACTION THERAPY: CPT | Performed by: PHYSICAL THERAPIST

## 2021-02-12 NOTE — PROGRESS NOTES
Routine Follow-up    Nasrin Rodriguez 54 y o  female   Date:  2/12/2021      Assessment and Plan:    Ezequiel Samano was seen today for follow-up  Diagnoses and all orders for this visit:    Hypothyroidism, unspecified type  - continue synthroid   - obtain overdue labs in chart    Mild intermittent asthma in adult without complication  - stable, no exacerbation  - continue prn ventolin use    Encounter for immunization    Screening for cervical cancer  -     Ambulatory referral to Obstetrics / Gynecology; Future    Need for Tdap vaccination  -     TDAP VACCINE GREATER THAN OR EQUAL TO 6YO IM    Negative depression screening                 HPI:  Chief Complaint   Patient presents with    Follow-up     6 month check  No concerns today  Declined flu shot  HPI   Patient is a 53 yo female who presents for routine follow up  She is doing well  She is caring for her mom right now, forgot to do her labs  She will go next week or this weekend  She is going to therapy for a pinched nerve in neck  It has helped a lot, a lot less tight  She feels well on synthroid without concerns  She has not required albuterol inhaler often  She is no longer on singulair - only needed it briefly last year during home renovations  She declines PNA vaccine  She is overdue for GYN care, wishes to establish with Dr Carlos A Will  ROS: Review of Systems   Constitutional: Negative  Respiratory: Negative  Cardiovascular: Negative  Gastrointestinal: Negative  Genitourinary: Negative  Musculoskeletal: Positive for arthralgias and neck pain  Negative for gait problem  Skin: Negative  Neurological: Negative  Psychiatric/Behavioral: Negative          Past Medical History:   Diagnosis Date    Disease of thyroid gland     Pinched nerve in neck     Thyroid disorder      Patient Active Problem List   Diagnosis    Asthma    Hyperlipidemia    Hypothyroidism    Vitamin D deficiency    Vertigo    Seasonal allergic rhinitis due to pollen  Acute frontal sinusitis    Axillary mass, left    Cervical radiculopathy       Past Surgical History:   Procedure Laterality Date     SECTION         Social History     Socioeconomic History    Marital status: /Civil Union     Spouse name: None    Number of children: None    Years of education: None    Highest education level: None   Occupational History    None   Social Needs    Financial resource strain: None    Food insecurity     Worry: None     Inability: None    Transportation needs     Medical: No     Non-medical: No   Tobacco Use    Smoking status: Never Smoker    Smokeless tobacco: Never Used   Substance and Sexual Activity    Alcohol use: No    Drug use: No    Sexual activity: Yes     Partners: Male   Lifestyle    Physical activity     Days per week: None     Minutes per session: None    Stress: None   Relationships    Social connections     Talks on phone: None     Gets together: None     Attends Congregation service: None     Active member of club or organization: None     Attends meetings of clubs or organizations: None     Relationship status: None    Intimate partner violence     Fear of current or ex partner: None     Emotionally abused: None     Physically abused: None     Forced sexual activity: None   Other Topics Concern    None   Social History Narrative    Always uses seat belt    Caffeine use, 2 cups of coffee per day       Family History   Problem Relation Age of Onset    Stroke Mother         cerebrovascular accident (CVA)    Other Mother         Epilepsy    Hypertension Father     Basal cell carcinoma Father     Heart attack Father         myocardial infarction    Other Other         epilepsy    Other Other         epilepsy    No Known Problems Daughter     No Known Problems Maternal Aunt     No Known Problems Maternal Aunt     No Known Problems Maternal Aunt        Allergies   Allergen Reactions    Other Itching and Swelling     Artificial Sweeteners and Food Presertives    Monosodium Glutamate Hives and Itching         Current Outpatient Medications:     albuterol (Ventolin HFA) 90 mcg/act inhaler, Inhale 2 puffs every 6 (six) hours as needed for wheezing, Disp: 2 Inhaler, Rfl: 3    Cholecalciferol (VITAMIN D3) 2000 units capsule, Take 1 tablet by mouth daily, Disp: , Rfl:     levothyroxine 100 mcg tablet, TAKE 1 TABLET DAILY, Disp: 90 tablet, Rfl: 3    multivitamin (THERAGRAN) TABS, Take 1 tablet by mouth daily, Disp: , Rfl:       Physical Exam:  /90 (BP Location: Left arm, Patient Position: Sitting, Cuff Size: Large)   Pulse 82   Temp 97 6 °F (36 4 °C) (Tympanic)   Ht 5' 9" (1 753 m)   Wt 121 kg (267 lb)   SpO2 98%   BMI 39 43 kg/m²     Physical Exam  Constitutional:       General: She is not in acute distress  Appearance: Normal appearance  She is obese  HENT:      Head: Normocephalic and atraumatic  Right Ear: Tympanic membrane, ear canal and external ear normal       Left Ear: Tympanic membrane, ear canal and external ear normal       Nose: Nose normal       Mouth/Throat:      Mouth: Mucous membranes are moist       Pharynx: Oropharynx is clear  Eyes:      Conjunctiva/sclera: Conjunctivae normal       Pupils: Pupils are equal, round, and reactive to light  Cardiovascular:      Rate and Rhythm: Normal rate and regular rhythm  Heart sounds: No murmur  Pulmonary:      Effort: Pulmonary effort is normal  No respiratory distress  Breath sounds: Normal breath sounds  Musculoskeletal:         General: No deformity  Skin:     General: Skin is warm and dry  Coloration: Skin is not pale  Neurological:      General: No focal deficit present  Mental Status: She is alert and oriented to person, place, and time  Cranial Nerves: No cranial nerve deficit     Psychiatric:         Mood and Affect: Mood normal          Behavior: Behavior normal            Labs:  No results found for: WBC, HGB, HCT, MCV, PLT  Lab Results   Component Value Date     04/21/2018    K 4 5 01/17/2020     01/17/2020    CO2 30 01/17/2020    ANIONGAP 8 9 04/21/2018    BUN 15 01/17/2020    CREATININE 0 76 01/17/2020    GLUF 93 01/17/2020    CALCIUM 9 1 01/17/2020    AST 16 01/17/2020    ALT 36 01/17/2020    ALKPHOS 54 01/17/2020    PROT 6 8 04/21/2018    BILITOT 0 7 04/21/2018    EGFR 89 01/17/2020

## 2021-02-12 NOTE — PROGRESS NOTES
Daily Note     Today's date: 2021  Patient name: Yulissa Cotto  : 1965  MRN: [id number]  Referring provider: Elver Holland MD  Dx:   Encounter Diagnosis     ICD-10-CM    1  Left cervical radiculopathy  M54 12        Start Time: 1005  Stop Time: 1050  Total time in clinic (min): 45 minutes    Subjective: No new complaints  Objective: See treatment diary below      Assessment: Tolerated treatment well  Increased mechanical traction to 21#, no c/o of increased pain  Plan: Progress note during next visit        Precautions: standard      Manuals 2/12 12/7 12/9 1/18  1/20 1/25 1/27 2/3  2/8 2/10   Cervical STM, SO, traps, ROM 10' 10 10 15 15'  20' 15' 15' 15' 15'                Procedure             Mech traction static 21#/ 15' 19#/15' 19#/15 19# 20#/ 30 20# 10'  20#/ 30 20#/ 20#  15' 20#/ 30                TherEx             Posture exs  Shoulder pinch, roll  20x ea 20x ea 30x ea 30x ea 30x ea       Chin tucks  20x 20x 30x 30x 30x   30x  30x   pec stretch ER  1 1/2" 1 1/2  2x30" 2x30"       Bolster pec  2" 2"          UBE 6  4 min 6' 6' 6' 6' 6' 6' 6'   tband Row 40#/ 30    30x blue 30x blue  cybex40#/ 30x 40#/ 30 40#/ 30 40#/ 30   tband posture row 40#/ 30    30x blue 30x blue Cable 40#/20x 40#/ 30 40#/ 30 40#/ 30   bicep 40#/ 30      40#/ 20 40#/ 30 40#/ 30 40#/ 30   Tricep 50#/ 30      45#? 30 50#/ 30 50#/ 30 50#/ 30   HEP  5'                                                                                                      Ther Activity                                       Gait Training                                       Modalities

## 2021-02-15 ENCOUNTER — EVALUATION (OUTPATIENT)
Dept: PHYSICAL THERAPY | Age: 56
End: 2021-02-15
Payer: COMMERCIAL

## 2021-02-15 ENCOUNTER — TRANSCRIBE ORDERS (OUTPATIENT)
Dept: PHYSICAL THERAPY | Age: 56
End: 2021-02-15

## 2021-02-15 DIAGNOSIS — M54.12 LEFT CERVICAL RADICULOPATHY: Primary | ICD-10-CM

## 2021-02-15 DIAGNOSIS — M54.12 BRACHIAL NEURITIS: Primary | ICD-10-CM

## 2021-02-15 PROCEDURE — 97012 MECHANICAL TRACTION THERAPY: CPT | Performed by: PHYSICAL THERAPIST

## 2021-02-15 PROCEDURE — 97140 MANUAL THERAPY 1/> REGIONS: CPT | Performed by: PHYSICAL THERAPIST

## 2021-02-15 PROCEDURE — 97110 THERAPEUTIC EXERCISES: CPT | Performed by: PHYSICAL THERAPIST

## 2021-02-15 NOTE — LETTER
February 15, 2021    Ash Palomo MD  [id number]    Patient: Janice Sanford   YOB: 1965   Date of Visit: 2/15/2021     Encounter Diagnosis     ICD-10-CM    1  Left cervical radiculopathy  M54 12        Dear Dr Cande Robertson: Thank you for your recent referral of Janice Sanford  Please review the attached evaluation summary from Nicki's recent visit  Please verify that you agree with the plan of care by signing the attached order  If you have any questions or concerns, please do not hesitate to call  I sincerely appreciate the opportunity to share in the care of one of your patients and hope to have another opportunity to work with you in the near future  Sincerely,    Christi Estrella, PT      Referring Provider:      I certify that I have read the below Plan of Care and certify the need for these services furnished under this plan of treatment while under my care  Ash Palomo MD  [id number]  Via Fax: [phone]          PT Re-Evaluation     Today's date: 2/15/2021  Patient name: Janice Sanford  : 1965  MRN: [id number]  Referring provider: Gabriela Bucio MD  Dx:   Encounter Diagnosis     ICD-10-CM    1  Left cervical radiculopathy  M54 12        Start Time: 1000  Stop Time: 1045  Total time in clinic (min): 45 minutes    Assessment  Assessment details: Janice Sanford has had 9 sessions of physical therapy to date for cervical radiculopathy  She had decreased arm symptoms overall but continues to have paraesthesias in her left hand with carrying objects or performing repetitive activity and driving  Her cervical ROM is [facility] and she has decreased tightness in her cervical musculature   Miguel Sapp is making progress towards her goals and may benefit from continuation of care to achieve max functional potential   Impairments: abnormal or restricted ROM, impaired physical strength, lacks appropriate home exercise program, pain with function, poor posture  and poor body mechanics  Functional limitations: diffiulty crocheting, driving, sustained positions incluidng working on computerUnderstanding of Dx/Px/POC: good   Prognosis: good    Goals  ST-3 WEEKS  1  Decrease left arm frequency and intenisty of pain by 2 points on VAS at its worst  Achieved  2  Decrease dural tension in left arm with pt able to achieve full elbow extension in test position  Achieved  3  Improve posture awareness during work and recreational activities  Achieve    LT-6 WEEKS  1  Pt able to patti without increasing left arm symptoms  Working towards  2  Independent with HEP and/or fitness program  Pt compliant with posture exs  New goals:  Pt able to drive without difficulty  Plan  Patient would benefit from: skilled physical therapy  Planned modality interventions: traction  Planned therapy interventions: home exercise program, manual therapy, neuromuscular re-education, patient education, postural training, strengthening, stretching, therapeutic activities and therapeutic exercise  Frequency: 2x week  Duration in weeks: 4  Plan of Care beginning date: 2020  Plan of Care expiration date: 3/28/2021  Treatment plan discussed with: patient        Subjective Evaluation    History of Present Illness  Mechanism of injury: Pt reports since March she has been having a sensation of a lump and tingling in the left arm that radiates down the arm towards the thumb  Pt c/o tingling in digits 1,2,3  She does not describe waking pain  US showed no findings, CTscan was cancelled  She was evaluated for CTS  And EMG did showed moderate CTS compression  She was then was referred to pain management as the doctor felt her symptoms were more consistent with radiculopathy  2021  Pt feels she initially was feeling some improvement with PT but had to stop due to quarantining for Covid-19   She returns today with decreased complaints of radicular symptoms and does not feel discomfort in her axilla  2/15  Pt has decreased arm pain and the feeling of having a "lump" in her axilla has diminished  She still gets tingling with driving and carrying things or repetitive activity  Pt does have a history of CTS  She denies dropping objects  Pain  Current pain ratin  At best pain ratin  At worst pain ratin  Location: left arm/hand  Quality: radiating (feels likes a lump)  Progression: improved    Hand dominance: right    Patient Goals  Patient goal: relieve left arm pain/ parasthesias        Objective     Concurrent Complaints  Positive for dizziness (20 year history) and headaches (migraine)  Postural Observations  Seated posture: fair  Standing posture: fair  Correction of posture: has no consistent effect        Palpation   Left   Tenderness of the cervical paraspinals and upper trapezius  Right   Tenderness of the upper trapezius  Cervical Spine Comments  Left cervical paraspinals: lower cervical C5,6,7       Neurological Testing     Reflexes   Left   Biceps (C5/C6): normal (2+)  Brachioradialis (C6): normal (2+)    Right   Biceps (C5/C6): normal (2+)  Brachioradialis (C6): normal (2+)    Additional Neurological Details  Sensation grossly intact    Active Range of Motion   Cervical/Thoracic Spine       Cervical    Flexion:  WFL  Extension: 54 ("I feel it under my armpit", produces symptoms) degrees      Left lateral flexion: 45 degrees      Right lateral flexion: 45 degrees      Left rotation: 65 (brings on symptoms) degrees  Right rotation: 65 degrees             Joint Play     Pain: C5 and C6     Comments: Decreased pain at C5-6 to palpation and mobiization    Strength/Myotome Testing     Left Shoulder     Planes of Motion   Flexion: 5   Abduction: 5   External rotation at 0°: 5     Right Shoulder     Planes of Motion   Flexion: 5   Abduction: 5   External rotation at 0°: 5     Left Elbow   Flexion: 5  Extension: 5    Right Elbow   Flexion: 5  Extension: 5    Left Wrist/Hand      (2nd hand position)     Trial 1: 50    Trial 2: 50    Trial 3: 45    Thumb Strength  Key/Lateral Pinch     Trial 1: 17    Trial 2: 16    Trial 3: 15    Average: 16  Tip/Two-Point Pinch     Trial 1: 12    Trial 2: 14    Trial 3: 15    Average: 13 67    Right Wrist/Hand      (2nd hand position)     Trial 1: 45    Trial 2: 55    Trial 3: 52    Thumb Strength   Key/Lateral Pinch     Trial 1: 16    Trial 2: 15    Trial 3: 14    Average: 15  Tip/Two-Point Pinch     Trial 1: 13    Trial 2: 13    Trial 3: 13    Average: 13    Tests   Cervical     Left   Positive Spurling's Test A  Left Shoulder   Positive ULTT1 and cervical rotation lateral flexion  Negative Nasir TOS  Additional Tests Details  Pt noted some relief with distraction  Neuro Exam:     Headaches   Patient reports headaches: Yes (migraine)                 Precautions: standard        Manuals 2/12 2/15 12/9 1/18  1/20 1/25 1/27 2/3  2/8 2/10   Cervical STM, SO, traps, ROM 10' 10 10 15 15'  20' 15' 15' 15' 15'                Procedure             [facility] traction static 21#/ 15' 21# /15' 19#/15 19# 20#/ 30 20# 10'  20#/ 30 20#/ 20#  15' 20#/ 30                TherEx             Posture exs  Shoulder pinch, roll  20x ea 20x ea 30x ea 30x ea 30x ea       Chin tucks  20x 20x 30x 30x 30x   30x  30x   pec stretch ER  1 1/2" 1 1/2  2x30" 2x30"       Bolster pec  2" 2"          UBE 6  4 min 6' 6' 6' 6' 6' 6' 6'   tband Row 40#/ 30 40#/ 30   30x blue 30x blue  cybex40#/ 30x 40#/ 30 40#/ 30 40#/ 30   tband posture row 40#/ 30 40#/ 30   30x blue 30x blue Cable 40#/20x 40#/ 30 40#/ 30 40#/ 30   bicep 40#/ 30 40#? 30     40#/ 20 40#/ 30 40#/ 30 40#/ 30   Tricep 50#/ 30 50#/ 30     45#? 30 50#/ 30 50#/ 30 50#/ 30   HEP                                                                                                        Ther Activity                                       Gait Training                                       Modalities

## 2021-02-15 NOTE — PROGRESS NOTES
PT Re-Evaluation     Today's date: 2/15/2021  Patient name: Justin Austin  : 1965  MRN: [id number]  Referring provider: Henry Salvador MD  Dx:   Encounter Diagnosis     ICD-10-CM    1  Left cervical radiculopathy  M54 12        Start Time: 1000  Stop Time: 1045  Total time in clinic (min): 45 minutes    Assessment  Assessment details: Justin Austin has had 9 sessions of physical therapy to date for cervical radiculopathy  She had decreased arm symptoms overall but continues to have paraesthesias in her left hand with carrying objects or performing repetitive activity and driving  Her cervical ROM is [facility] and she has decreased tightness in her cervical musculature  Ida Turner is making progress towards her goals and may benefit from continuation of care to achieve max functional potential   Impairments: abnormal or restricted ROM, impaired physical strength, lacks appropriate home exercise program, pain with function, poor posture  and poor body mechanics  Functional limitations: diffiulty crocheting, driving, sustained positions incluidng working on computerUnderstanding of Dx/Px/POC: good   Prognosis: good    Goals  ST-3 WEEKS  1  Decrease left arm frequency and intenisty of pain by 2 points on VAS at its worst  Achieved  2  Decrease dural tension in left arm with pt able to achieve full elbow extension in test position  Achieved  3  Improve posture awareness during work and recreational activities  Achieve    LT-6 WEEKS  1  Pt able to patti without increasing left arm symptoms  Working towards  2  Independent with HEP and/or fitness program  Pt compliant with posture exs  New goals:  Pt able to drive without difficulty      Plan  Patient would benefit from: skilled physical therapy  Planned modality interventions: traction  Planned therapy interventions: home exercise program, manual therapy, neuromuscular re-education, patient education, postural training, strengthening, stretching, therapeutic activities and therapeutic exercise  Frequency: 2x week  Duration in weeks: 4  Plan of Care beginning date: 2020  Plan of Care expiration date: 3/28/2021  Treatment plan discussed with: patient        Subjective Evaluation    History of Present Illness  Mechanism of injury: Pt reports since March she has been having a sensation of a lump and tingling in the left arm that radiates down the arm towards the thumb  Pt c/o tingling in digits 1,2,3  She does not describe waking pain  US showed no findings, CTscan was cancelled  She was evaluated for CTS  And EMG did showed moderate CTS compression  She was then was referred to pain management as the doctor felt her symptoms were more consistent with radiculopathy  2021  Pt feels she initially was feeling some improvement with PT but had to stop due to quarantining for Covid-19  She returns today with decreased complaints of radicular symptoms and does not feel discomfort in her axilla  2/15  Pt has decreased arm pain and the feeling of having a "lump" in her axilla has diminished  She still gets tingling with driving and carrying things or repetitive activity  Pt does have a history of CTS  She denies dropping objects  Pain  Current pain ratin  At best pain ratin  At worst pain ratin  Location: left arm/hand  Quality: radiating (feels likes a lump)  Progression: improved    Hand dominance: right    Patient Goals  Patient goal: relieve left arm pain/ parasthesias        Objective     Concurrent Complaints  Positive for dizziness (20 year history) and headaches (migraine)  Postural Observations  Seated posture: fair  Standing posture: fair  Correction of posture: has no consistent effect        Palpation   Left   Tenderness of the cervical paraspinals and upper trapezius  Right   Tenderness of the upper trapezius  Cervical Spine Comments  Left cervical paraspinals: lower cervical C5,6,7       Neurological Testing     Reflexes   Left Biceps (C5/C6): normal (2+)  Brachioradialis (C6): normal (2+)    Right   Biceps (C5/C6): normal (2+)  Brachioradialis (C6): normal (2+)    Additional Neurological Details  Sensation grossly intact    Active Range of Motion   Cervical/Thoracic Spine       Cervical    Flexion:  WFL  Extension: 54 ("I feel it under my armpit", produces symptoms) degrees      Left lateral flexion: 45 degrees      Right lateral flexion: 45 degrees      Left rotation: 65 (brings on symptoms) degrees  Right rotation: 65 degrees             Joint Play     Pain: C5 and C6     Comments: Decreased pain at C5-6 to palpation and mobiization    Strength/Myotome Testing     Left Shoulder     Planes of Motion   Flexion: 5   Abduction: 5   External rotation at 0°: 5     Right Shoulder     Planes of Motion   Flexion: 5   Abduction: 5   External rotation at 0°: 5     Left Elbow   Flexion: 5  Extension: 5    Right Elbow   Flexion: 5  Extension: 5    Left Wrist/Hand      (2nd hand position)     Trial 1: 50    Trial 2: 50    Trial 3: 45    Thumb Strength  Key/Lateral Pinch     Trial 1: 17    Trial 2: 16    Trial 3: 15    Average: 16  Tip/Two-Point Pinch     Trial 1: 12    Trial 2: 14    Trial 3: 15    Average: 13 67    Right Wrist/Hand      (2nd hand position)     Trial 1: 45    Trial 2: 55    Trial 3: 52    Thumb Strength   Key/Lateral Pinch     Trial 1: 16    Trial 2: 15    Trial 3: 14    Average: 15  Tip/Two-Point Pinch     Trial 1: 13    Trial 2: 13    Trial 3: 13    Average: 13    Tests   Cervical     Left   Positive Spurling's Test A  Left Shoulder   Positive ULTT1 and cervical rotation lateral flexion  Negative Nasir TOS  Additional Tests Details  Pt noted some relief with distraction  Neuro Exam:     Headaches   Patient reports headaches: Yes (migraine)                 Precautions: standard        Manuals 2/12 2/15 12/9 1/18  1/20 1/25 1/27 2/3  2/8 2/10   Cervical STM, SO, traps, ROM 10' 10 10 15 15'  20' 15' 15' 15' 15' Procedure             Mech traction static 21#/ 15' 21# /15' 19#/15 19# 20#/ 30 20# 10'  20#/ 30 20#/ 20#  15' 20#/ 30                TherEx             Posture exs  Shoulder pinch, roll  20x ea 20x ea 30x ea 30x ea 30x ea       Chin tucks  20x 20x 30x 30x 30x   30x  30x   pec stretch ER  1 1/2" 1 1/2  2x30" 2x30"       Bolster pec  2" 2"          UBE 6  4 min 6' 6' 6' 6' 6' 6' 6'   tband Row 40#/ 30 40#/ 30   30x blue 30x blue  cybex40#/ 30x 40#/ 30 40#/ 30 40#/ 30   tband posture row 40#/ 30 40#/ 30   30x blue 30x blue Cable 40#/20x 40#/ 30 40#/ 30 40#/ 30   bicep 40#/ 30 40#? 30     40#/ 20 40#/ 30 40#/ 30 40#/ 30   Tricep 50#/ 30 50#/ 30     45#? 30 50#/ 30 50#/ 30 50#/ 30   HEP                                                                                                        Ther Activity                                       Gait Training                                       Modalities

## 2021-02-17 ENCOUNTER — OFFICE VISIT (OUTPATIENT)
Dept: PHYSICAL THERAPY | Age: 56
End: 2021-02-17
Payer: COMMERCIAL

## 2021-02-17 DIAGNOSIS — M54.12 LEFT CERVICAL RADICULOPATHY: Primary | ICD-10-CM

## 2021-02-17 PROCEDURE — 97140 MANUAL THERAPY 1/> REGIONS: CPT | Performed by: PHYSICAL THERAPIST

## 2021-02-17 PROCEDURE — 97012 MECHANICAL TRACTION THERAPY: CPT | Performed by: PHYSICAL THERAPIST

## 2021-02-17 PROCEDURE — 97110 THERAPEUTIC EXERCISES: CPT | Performed by: PHYSICAL THERAPIST

## 2021-02-17 NOTE — PROGRESS NOTES
Daily Note     Today's date: 2021  Patient name: Anthony Rodriguez  : 1965  MRN: [id number]  Referring provider: Brian Malik MD  Dx:   Encounter Diagnosis     ICD-10-CM    1  Left cervical radiculopathy  M54 12        Start Time:   Stop Time: 1730  Total time in clinic (min): 45 minutes    Subjective: Pt reports she continues to feel better with PT  She is having her  help to stretch her at home  Objective: See treatment diary below      Assessment: Tolerated treatment well  Patient posture improving  Full elbow extension achieved in dural stretch  Plan: Continue per plan of care        Precautions: standard        Manuals 2/12 2/15 2/17 1/18  1/20 1/25 1/27 2/3  2/8 2/10   Cervical STM, SO, traps, ROM 10' 10 15 15 15'  20' 15' 15' 15' 15'                Procedure             Mech traction static 21#/ 15' 21# /15' 21# /15 19# 20#/ 30 20# 10'  20#/ 30 20#/ 20#  15' 20#/ 30                TherEx             Posture exs  Shoulder pinch, roll  20x ea  30x ea 30x ea 30x ea       Chin tucks  20x  30x 30x 30x   30x  30x   pec stretch ER  1 /"   2x30" 2x30"       Bolster pec  2"           UBE 6 6 6 min 6' 6' 6' 6' 6' 6' 6'   tband Row 40#/ 30 40#/ 30 40#/ 30  30x blue 30x blue  cybex40#/ 30x 40#/ 30 40#/ 30 40#/ 30   cable posture row 40#/ 30 40#/ 30 40#/ 30  30x blue 30x blue Cable 40#/20x 40#/ 30 40#/ 30 40#/ 30   bicep 40#/ 30 40#? 30 40#/ 30    40#/ 20 40#/ 30 40#/ 30 40#/ 30   Tricep 50#/ 30 50#/ 30 50#/ 30    45#? 30 50#/ 30 50#/ 30 50#/ 30   HEP                                                                                                        Ther Activity                                       Gait Training                                       Modalities

## 2021-02-22 ENCOUNTER — OFFICE VISIT (OUTPATIENT)
Dept: PHYSICAL THERAPY | Age: 56
End: 2021-02-22
Payer: COMMERCIAL

## 2021-02-22 DIAGNOSIS — M54.12 LEFT CERVICAL RADICULOPATHY: Primary | ICD-10-CM

## 2021-02-22 PROCEDURE — 97140 MANUAL THERAPY 1/> REGIONS: CPT | Performed by: PHYSICAL THERAPIST

## 2021-02-22 PROCEDURE — 97012 MECHANICAL TRACTION THERAPY: CPT | Performed by: PHYSICAL THERAPIST

## 2021-02-22 PROCEDURE — 97110 THERAPEUTIC EXERCISES: CPT | Performed by: PHYSICAL THERAPIST

## 2021-02-22 NOTE — PROGRESS NOTES
Daily Note     Today's date: 2021  Patient name: Ladonna Mar  : 1965  MRN: [id number]  Referring provider: Lee Jensen MD  Dx:   Encounter Diagnosis     ICD-10-CM    1  Left cervical radiculopathy  M54 12        Start Time: 1105  Stop Time: 1150  Total time in clinic (min): 45 minutes    Subjective: Pt reports rest symptoms have dissipated  She did report tingling in hand while crocheting but also has a history of CTS in left hand  Objective: See treatment diary below      Assessment: Tolerated treatment well  Patient able to tolerate hyperabduction with dural stretch  Decreased cervical tightness  Compliant with daily stretches  May suggest OTC traction type device for home use  Plan: Progress to discharge by end of next week       Precautions: standard        Manuals 2/12 2/15 2/17 1/18  1/20 1/25 1/27 2/3  2/8 2/10   Cervical STM, SO, traps, ROM 10' 10 15 15 15'  20' 15' 15' 15' 15'                Procedure             Mech traction static 21#/ 15' 21# /15' 21# /15 19# 20#/ 30 20# 10'  20#/ 30 20#/ 20#  15' 20#/ 30                TherEx             Posture exs  Shoulder pinch, roll  20x ea  30x ea 30x ea 30x ea       Chin tucks  20x  30x 30x 30x   30x  30x   pec stretch ER  1 "   2x30" 2x30"       Bolster pec  2"           UBE 6 6 6 min 6' 6' 6' 6' 6' 6' 6'   tband Row 40#/ 30 40#/ 30 40#/ 30  30x blue 30x blue  cybex40#/ 30x 40#/ 30 40#/ 30 40#/ 30   cable posture row 40#/ 30 40#/ 30 40#/ 30  30x blue 30x blue Cable 40#/20x 40#/ 30 40#/ 30 40#/ 30   bicep 40#/ 30 40#? 30 40#/ 30    40#/ 20 40#/ 30 40#/ 30 40#/ 30   Tricep 50#/ 30 50#/ 30 50#/ 30    45#? 30 50#/ 30 50#/ 30 50#/ 30   HEP                                                                                                        Ther Activity                                       Gait Training                                       Modalities

## 2021-02-24 ENCOUNTER — OFFICE VISIT (OUTPATIENT)
Dept: PHYSICAL THERAPY | Age: 56
End: 2021-02-24
Payer: COMMERCIAL

## 2021-02-24 DIAGNOSIS — M54.12 LEFT CERVICAL RADICULOPATHY: Primary | ICD-10-CM

## 2021-02-24 PROCEDURE — 97012 MECHANICAL TRACTION THERAPY: CPT | Performed by: PHYSICAL THERAPIST

## 2021-02-24 PROCEDURE — 97140 MANUAL THERAPY 1/> REGIONS: CPT | Performed by: PHYSICAL THERAPIST

## 2021-02-24 PROCEDURE — 97110 THERAPEUTIC EXERCISES: CPT | Performed by: PHYSICAL THERAPIST

## 2021-02-24 NOTE — PROGRESS NOTES
Daily Note     Today's date: 2021  Patient name: Alberta Ordonez  : 1965  MRN: [id number]  Referring provider: Kimberly Bolden MD  Dx:   Encounter Diagnosis     ICD-10-CM    1  Left cervical radiculopathy  M54 12        Start Time: 1630  Stop Time: 1715  Total time in clinic (min): 45 minutes    Subjective: No new complaints  Objective: See treatment diary below      Assessment: Tolerated treatment well  Increased mechanical traction to 21# without any increase in pain and good tolerance  Plan: Continue per plan of care        Precautions: standard        Manuals 2/12 2/15 2/17 2/24 1/20 1/25 1/27 2/3  2/8 2/10   Cervical STM, SO, traps, ROM 10' 10 15 15 15'  20' 15' 15' 15' 15'                Procedure             Mech traction static 21#/ 15' 21# /15' 21# /15 21# 20#/ 30 20# 10'  20#/ 30 20#/ 20#  15' 20#/ 30                TherEx             Posture exs  Shoulder pinch, roll  20x ea  30x ea 30x ea 30x ea       Chin tucks  20x  30x 30x 30x   30x  30x   pec stretch ER  1 /"   2x30" 2x30"       Bolster pec  2"           UBE 6 6 6 min 6' 6' 6' 6' 6' 6' 6'   tband Row 40#/ 30 40#/ 30 40#/ 30 40#/ 30 30x blue 30x blue  cybex40#/ 30x 40#/ 30 40#/ 30 40#/ 30   cable posture row 40#/ 30 40#/ 30 40#/ 30 40#/ 30 30x blue 30x blue Cable 40#/20x 40#/ 30 40#/ 30 40#/ 30   bicep 40#/ 30 40#? 30 40#/ 30 40#/ 30   40#/ 20 40#/ 30 40#/ 30 40#/ 30   Tricep 50#/ 30 50#/ 30 50#/ 30 50#/ 30   45#? 30 50#/ 30 50#/ 30 50#/ 30   HEP                                                                                                        Ther Activity                                       Gait Training                                       Modalities

## 2021-03-01 ENCOUNTER — OFFICE VISIT (OUTPATIENT)
Dept: PHYSICAL THERAPY | Age: 56
End: 2021-03-01
Payer: COMMERCIAL

## 2021-03-01 DIAGNOSIS — M54.12 LEFT CERVICAL RADICULOPATHY: Primary | ICD-10-CM

## 2021-03-01 PROCEDURE — 97140 MANUAL THERAPY 1/> REGIONS: CPT | Performed by: PHYSICAL THERAPIST

## 2021-03-01 PROCEDURE — 97110 THERAPEUTIC EXERCISES: CPT | Performed by: PHYSICAL THERAPIST

## 2021-03-01 PROCEDURE — 97012 MECHANICAL TRACTION THERAPY: CPT | Performed by: PHYSICAL THERAPIST

## 2021-03-01 NOTE — PROGRESS NOTES
Daily Note     Today's date: 3/1/2021  Patient name: Rubin Kim  : 1965  MRN: [id number]  Referring provider: Vernon Simon MD  Dx:   Encounter Diagnosis     ICD-10-CM    1  Left cervical radiculopathy  M54 12        Start Time: 1630          Subjective: Symptoms not as often "I still feel it at times under my arm", does not last as long  Objective: See treatment diary below      Assessment: Tolerated treatment well  Patient progress beginning to plateau  Will instruct in HEP and review with progression to discharge  Plan: Progress to discharge       Precautions: standard        Manuals 2/12 2/15 2/17 2/24 3/1 1/25 1/27 2/3  2/8 2/10   Cervical STM, SO, traps, ROM 10' 10 15 15 15'  20' 15' 15' 15' 15'                Procedure             Mech traction static 21#/ 15' 21# /15' 21# /15 21# 21#/ 30 20# 10'  20#/ 30 20#/ 20#  15' 20#/ 30                TherEx             Posture exs  Shoulder pinch, roll  20x ea  30x ea  30x ea       Chin tucks  20x  30x  30x   30x  30x   pec stretch ER  1 "    2x30"       Bolster pec  2"           UBE 6 6 6 min 6' 6' 6' 6' 6' 6' 6'   tband Row 40#/ 30 40#/ 30 40#/ 30 40#/ 30 30x blue 30x blue  cybex40#/ 30x 40#/ 30 40#/ 30 40#/ 30   cable posture row 40#/ 30 40#/ 30 40#/ 30 40#/ 30 30x blue 30x blue Cable 40#/20x 40#/ 30 40#/ 30 40#/ 30   bicep 40#/ 30 40#? 30 40#/ 30 40#/ 30 40#/30  40#/ 20 40#/ 30 40#/ 30 40#/ 30   Tricep 50#/ 30 50#/ 30 50#/ 30 50#/ 30 50#/ 30  45#? 30 50#/ 30 50#/ 30 50#/ 30   HEP                                                                                                        Ther Activity                                       Gait Training                                       Modalities

## 2021-03-03 ENCOUNTER — OFFICE VISIT (OUTPATIENT)
Dept: PHYSICAL THERAPY | Age: 56
End: 2021-03-03
Payer: COMMERCIAL

## 2021-03-03 DIAGNOSIS — M54.12 LEFT CERVICAL RADICULOPATHY: Primary | ICD-10-CM

## 2021-03-03 PROCEDURE — 97012 MECHANICAL TRACTION THERAPY: CPT

## 2021-03-03 PROCEDURE — 97110 THERAPEUTIC EXERCISES: CPT

## 2021-03-03 PROCEDURE — 97140 MANUAL THERAPY 1/> REGIONS: CPT | Performed by: PHYSICAL THERAPIST

## 2021-03-03 NOTE — PROGRESS NOTES
Daily Note     Today's date: 3/3/2021  Patient name: Rubin Kim  : 1965  MRN: [id number]  Referring provider: Vernon Simon MD  Dx:   Encounter Diagnosis     ICD-10-CM    1  Left cervical radiculopathy  M54 12        Start Time: 1630  Stop Time: 1715  Total time in clinic (min): 45 minutes    Subjective: Patient reports that she is having some pins and needles on her left arm today due to increased stress  Objective: See treatment diary below      Assessment: Tolerated treatment well, less neuro  tension noted in her arm and her AROM of cervical spine improving  Patient exhibited good technique with therapeutic exercises and would benefit from continued PT      Plan: Continue per plan of care        Precautions: standard        Manuals 2/12 2/15 2/17 2/24 3/1 3/3  2/3  2/8 2/10   Cervical STM, SO, traps, ROM 10' 10 15 15 15'  20' 15' 15' 15' 15'                Procedure             Mech traction static 21#/ 15' 21# /15' 21# /15 21# 21#/ 30 21# 10'  20#/ 30 20#/ 20#  15' 20#/ 30                TherEx             Posture exs  Shoulder pinch, roll  20x ea  30x ea         Chin tucks  20x  30x  30x   30x  30x   pec stretch ER  1 /"           Bolster pec  2"           UBE 6 6 6 min 6' 6' 6' 6' 6' 6' 6'   tband Row 40#/ 30 40#/ 30 40#/ 30 40#/ 30 30x blue 40#/30 cybex40#/ 30x 40#/ 30 40#/ 30 40#/ 30   cable posture row 40#/ 30 40#/ 30 40#/ 30 40#/ 30 30x blue 40#/30 Cable 40#/20x 40#/ 30 40#/ 30 40#/ 30   bicep 40#/ 30 40#? 30 40#/ 30 40#/ 30 40#/30 40#/30 40#/ 20 40#/ 30 40#/ 30 40#/ 30   Tricep 50#/ 30 50#/ 30 50#/ 30 50#/ 30 50#/ 30 50#/30 45#? 30 50#/ 30 50#/ 30 50#/ 30   HEP                                                                                                        Ther Activity                                       Gait Training                                       Modalities

## 2021-04-23 NOTE — PROGRESS NOTES
Pt was significantly improved overall  Pt did feel residual paraesthesias may be related to CTS  Pt met all PT goals

## 2021-08-04 ENCOUNTER — VBI (OUTPATIENT)
Dept: ADMINISTRATIVE | Facility: OTHER | Age: 56
End: 2021-08-04

## 2021-08-05 ENCOUNTER — RA CDI HCC (OUTPATIENT)
Dept: OTHER | Facility: HOSPITAL | Age: 56
End: 2021-08-05

## 2021-08-05 NOTE — PROGRESS NOTES
[facility]  coding opportunities          Chart reviewed, no opportunity found: CHART REVIEWED, NO OPPORTUNITY FOUND                     Patients insurance company:  [employer] [facility] (Medicare Advantage and Commercial)

## 2021-12-02 DIAGNOSIS — Z13.1 DIABETES MELLITUS SCREENING: ICD-10-CM

## 2021-12-02 DIAGNOSIS — E03.9 HYPOTHYROIDISM, UNSPECIFIED TYPE: Primary | ICD-10-CM

## 2021-12-02 DIAGNOSIS — Z13.220 LIPID SCREENING: ICD-10-CM

## 2021-12-14 ENCOUNTER — VBI (OUTPATIENT)
Dept: ADMINISTRATIVE | Facility: OTHER | Age: 56
End: 2021-12-14

## 2021-12-29 DIAGNOSIS — J45.21 MILD INTERMITTENT ASTHMA WITH ACUTE EXACERBATION: ICD-10-CM

## 2021-12-30 RX ORDER — ALBUTEROL SULFATE 90 UG/1
2 AEROSOL, METERED RESPIRATORY (INHALATION) EVERY 6 HOURS PRN
Qty: 6.7 G | Refills: 0 | Status: SHIPPED | OUTPATIENT
Start: 2021-12-30 | End: 2022-03-02 | Stop reason: SDUPTHER

## 2022-01-03 ENCOUNTER — APPOINTMENT (OUTPATIENT)
Dept: LAB | Facility: CLINIC | Age: 57
End: 2022-01-03
Payer: COMMERCIAL

## 2022-01-03 DIAGNOSIS — Z13.220 LIPID SCREENING: ICD-10-CM

## 2022-01-03 DIAGNOSIS — E03.9 HYPOTHYROIDISM, UNSPECIFIED TYPE: ICD-10-CM

## 2022-01-03 DIAGNOSIS — Z13.1 DIABETES MELLITUS SCREENING: ICD-10-CM

## 2022-01-03 LAB
CHOLEST SERPL-MCNC: 193 MG/DL
EST. AVERAGE GLUCOSE BLD GHB EST-MCNC: 111 MG/DL
HBA1C MFR BLD: 5.5 %
HDLC SERPL-MCNC: 49 MG/DL
LDLC SERPL CALC-MCNC: 133 MG/DL (ref 0–100)
NONHDLC SERPL-MCNC: 144 MG/DL
T4 FREE SERPL-MCNC: 1.39 NG/DL (ref 0.76–1.46)
TRIGL SERPL-MCNC: 55 MG/DL
TSH SERPL DL<=0.05 MIU/L-ACNC: 2.8 UIU/ML (ref 0.36–3.74)

## 2022-01-03 PROCEDURE — 36415 COLL VENOUS BLD VENIPUNCTURE: CPT

## 2022-01-03 PROCEDURE — 84439 ASSAY OF FREE THYROXINE: CPT

## 2022-01-03 PROCEDURE — 84443 ASSAY THYROID STIM HORMONE: CPT

## 2022-01-03 PROCEDURE — 83036 HEMOGLOBIN GLYCOSYLATED A1C: CPT

## 2022-01-03 PROCEDURE — 80061 LIPID PANEL: CPT

## 2022-02-14 ENCOUNTER — OFFICE VISIT (OUTPATIENT)
Dept: URGENT CARE | Facility: CLINIC | Age: 57
End: 2022-02-14
Payer: COMMERCIAL

## 2022-02-14 VITALS
RESPIRATION RATE: 18 BRPM | BODY MASS INDEX: 35.79 KG/M2 | HEART RATE: 100 BPM | OXYGEN SATURATION: 98 % | HEIGHT: 70 IN | WEIGHT: 250 LBS | TEMPERATURE: 98.2 F

## 2022-02-14 DIAGNOSIS — Z20.822 EXPOSURE TO COVID-19 VIRUS: Primary | ICD-10-CM

## 2022-02-14 DIAGNOSIS — J02.9 SORE THROAT: ICD-10-CM

## 2022-02-14 LAB — S PYO AG THROAT QL: NEGATIVE

## 2022-02-14 PROCEDURE — 87070 CULTURE OTHR SPECIMN AEROBIC: CPT | Performed by: PHYSICIAN ASSISTANT

## 2022-02-14 PROCEDURE — 99213 OFFICE O/P EST LOW 20 MIN: CPT | Performed by: PHYSICIAN ASSISTANT

## 2022-02-14 PROCEDURE — U0003 INFECTIOUS AGENT DETECTION BY NUCLEIC ACID (DNA OR RNA); SEVERE ACUTE RESPIRATORY SYNDROME CORONAVIRUS 2 (SARS-COV-2) (CORONAVIRUS DISEASE [COVID-19]), AMPLIFIED PROBE TECHNIQUE, MAKING USE OF HIGH THROUGHPUT TECHNOLOGIES AS DESCRIBED BY CMS-2020-01-R: HCPCS | Performed by: PHYSICIAN ASSISTANT

## 2022-02-14 PROCEDURE — 87147 CULTURE TYPE IMMUNOLOGIC: CPT | Performed by: PHYSICIAN ASSISTANT

## 2022-02-14 PROCEDURE — U0005 INFEC AGEN DETEC AMPLI PROBE: HCPCS | Performed by: PHYSICIAN ASSISTANT

## 2022-02-14 NOTE — PROGRESS NOTES
800            NAME: Ahsan Roman is a 64 y o  female  : 1965    MRN: [id number]  DATE: 2022  TIME: 8:40 AM    Assessment and Plan   Exposure to COVID-19 virus [Z20 822]  1  Exposure to COVID-19 virus  COVID Only -Office Collect   2  Sore throat  POCT rapid strepA    Throat culture       Patient Instructions   Rapid strep test completed and negative  Will send for culture and call patient if positive  COVID-19 (Coronavirus Disease 2019)   WHAT YOU NEED TO KNOW:   Coronavirus disease 2019 (COVID-19) is the disease caused by a coronavirus first discovered in 2019  Coronaviruses generally cause upper respiratory (nose, throat, and lung) infections, such as a cold  The new virus spreads quickly and easily  The virus can be spread starting 2 days before symptoms even begin  The virus has also changed into several new forms (called variants) since it was discovered  The variants may be more contagious (easily spread) than the original form  Some may also cause more severe illness than others  It is important to follow local, national, and worldwide measures to protect yourself and others from infection  DISCHARGE INSTRUCTIONS:   If you think you or someone you know may be infected:  Do the following to protect others:  · If emergency care is needed,  tell the  about the possible infection, or call ahead and tell the emergency department  · Call a healthcare provider  for instructions if symptoms are mild  Anyone who may be infected should not  arrive without calling first  The provider will need to protect staff members and other patients  · The person who may be infected needs to wear a face covering  while getting medical care  This will help lower the risk of infecting others  Coverings are not used for anyone who is younger than 2 years, has breathing problems, or cannot remove it   The provider can give you instructions for anyone who cannot wear a covering  Call your local emergency number (911 in the [address]) or an emergency department if:   · You have trouble breathing or shortness of breath at rest     · You have chest pain or pressure that lasts longer than 5 minutes  · You become confused or hard to wake  · Your lips or face are blue  · You have a fever of 104°F (40°C) or higher  Call your doctor if:   · You do not  have symptoms of COVID-19 but had close physical contact within 14 days with someone who tested positive  · You have questions or concerns about your condition or care  Medicines: You may need any of the following:  · Decongestants  help reduce nasal congestion and help you breathe more easily  If you take decongestant pills, they may make you feel restless or cause problems with your sleep  Do not use decongestant sprays for more than a few days  · Cough suppressants  help reduce coughing  Ask your healthcare provider which type of cough medicine is best for you  · Acetaminophen  decreases pain and fever  It is available without a doctor's order  Ask how much to take and how often to take it  Follow directions  Read the labels of all other medicines you are using to see if they also contain acetaminophen, or ask your doctor or pharmacist  Acetaminophen can cause liver damage if not taken correctly  Do not use more than 4 grams (4,000 milligrams) total of acetaminophen in one day  · NSAIDs , such as ibuprofen, help decrease swelling, pain, and fever  NSAIDs can cause stomach bleeding or kidney problems in certain people  If you take blood thinner medicine, always ask your healthcare provider if NSAIDs are safe for you  Always read the medicine label and follow directions  · Take your medicine as directed  Contact your healthcare provider if you think your medicine is not helping or if you have side effects  Tell him or her if you are allergic to any medicine   Keep a list of the medicines, vitamins, and herbs you take  Include the amounts, and when and why you take them  Bring the list or the pill bottles to follow-up visits  Carry your medicine list with you in case of an emergency  What you need to know about COVID-19 vaccines:  Get a vaccine even if you already had COVID-19  · COVID-19 vaccines are given as a shot in 1 or 2 doses  Some vaccines have emergency use authorization (EUA)  An EUA means the vaccine is not approved but is given because the benefits outweigh the risks  A 2-dose vaccine is fully approved for use in those 16 years or older  This vaccine also has an EUA for adolescents 12 to 15 years  Your healthcare provider can help you understand the benefits and risks  · A third dose is recommended for adults with a weakened immune system who get a 2-dose vaccine  The third dose is given at least 28 days after the second  · Even after you get the vaccine, continue social distancing and other measures  Experts are still learning how well the vaccines work to prevent infection, transmission, and severe illness  Although rare, you can become infected after you get the vaccine  You may also be able to pass the virus to others without knowing you are infected  · After you get the vaccine, check local, national, and international travel rules  Check to see if you need to be tested before you travel  You may also need to quarantine after you return  Some countries require proof of a negative test before you leave  You should also be tested 3 to 5 days after you return from another country  How the 2019 coronavirus spreads: The following are ways the virus is thought to spread, but more information may be coming:  · Droplets are the main way all coronaviruses spread  The virus travels in droplets that form when a person talks, coughs, or sneezes  The droplets can also float in the air for minutes or hours  Infection happens when you breathe in the droplets or get them in your eyes or nose  Close personal contact with an infected person increases your risk for infection  This means being within 6 feet (2 meters) of the person for at least 15 minutes over 24 hours  · Person-to-person contact can spread the virus  For example, a person with the virus on his or her hands can spread it by shaking hands with someone  · The virus can stay on objects and surfaces for a short time  You may become infected by touching the object or surface and then touching your eyes or mouth  · An infected animal may be able to infect a person who touches it  This may happen at live markets or on a farm  Help lower the risk for COVID-19:  The best way to prevent infection is to avoid anyone who is infected, but this can be hard to do  An infected person can spread the virus before signs or symptoms begin, or even if signs or symptoms never develop  The following can help lower the risk for infection:      · Wash your hands often throughout the day  Use soap and water  Rub your soapy hands together, lacing your fingers, for at least 20 seconds  Rinse with warm, running water  Dry your hands with a clean towel or paper towel  Use hand  that contains alcohol if soap and water are not available  Teach children how to wash their hands and use hand   · Cover sneezes and coughs  Turn your face away and cover your mouth and nose with a tissue  Throw the tissue away  Use the bend of your arm if a tissue is not available  Then wash your hands well with soap and water or use hand   Teach children how to cover a cough or sneeze  · Wear a face covering (mask) around anyone who does not live in your home  Use a cloth covering with at least 2 layers  You can also create layers by putting a cloth covering over a disposable non-medical mask  Cover your mouth and your nose  The covering should fit snugly against the bridge of your nose   Securely fasten it under your chin and on the sides of your face  Do not  wear a plastic face shield instead of a covering  Continue social distancing and washing your hands often  A face covering is not a substitute for social distancing safety measures  · Follow worldwide, national, and local social distancing guidelines  Keep at least 6 feet (2 meters) between you and others  Also keep this distance from anyone who comes to your home, such as someone making a delivery  Wear a face covering while you are around others  You will need to wear a covering in restaurants, stores, and other public buildings  You will also need a covering on mass transit, such as a bus, subway, or airplane  Remember to use a covering made from thick material or wear 2 coverings together  · Make a habit of not touching your face  If you get the virus on your hands, you can transfer it to your eyes, nose, or mouth and become infected  You can also transfer it to objects, surfaces, or people  Do not touch your eyes, nose, or mouth without washing your hands first     · Clean and disinfect high-touch surfaces and objects often  Use disinfecting wipes, or make a solution of 4 teaspoons of bleach in 1 quart (4 cups) of water  Clean and disinfect even if you think no one living in or coming to your home is infected with the virus  · Ask about other vaccines you may need  Get the influenza (flu) vaccine as soon as recommended each year, usually starting in September or October  Get the pneumonia vaccine if recommended  Your healthcare provider can tell you if you should also get other vaccines, and when to get them  Follow social distancing guidelines:  National and local social distancing rules vary  Rules may change over time as restrictions are lifted  Restrictions may return if an outbreak happens where you live  It is important to know and follow all current social distancing rules in your area   The following are general guidelines:  · Stay home if you are sick or think you may have COVID-19  It is important to stay home if you are waiting for a testing appointment or for test results  Even if you do not have symptoms, you can pass the virus to others  · Limit trips out of your home  Have food, medicines, and other supplies delivered and left at your door or other area, if possible  Plan trips out of your home so you make the fewest stops possible to limit close personal contact  Keep track of places you go  This will help contact tracers notify others if you become infected  · Avoid close physical contact with anyone who does not live in your home  Do not shake hands with, hug, or kiss a person as a greeting  If you must use public transportation (such as a bus or subway), try to sit or stand away from others  Only allow necessary people into your home  Wear your face covering, and remind others to wear a face covering  Remind them to wash their hands when they arrive and before they leave  Do not  let someone into your home or go to someone's home just to visit  Even if you both do not feel sick, the virus can pass from one of you to the other  · Avoid in-person gatherings and crowds  Gatherings or crowds of 10 or more individuals can cause the virus to spread  Avoid places such as [place], beaches, sporting events, and tourist attractions  For events such as parties, holiday meals, [employer] services, and conferences, attend virtually (on a computer), if possible  · Ask your healthcare provider for other ways to have appointments  Some providers offer phone, video, or other types of appointments  You may also be able to get prescriptions for a few months of your medicines at a time  · Stay safe if you must go out to work  Keep physical distance between you and other workers as much as possible  Follow your employer's rules so everyone stays safe      If you have COVID-19 and are recovering at home,  healthcare providers will give you specific instructions to follow  The following are general guidelines to remind you how to keep others safe until you are well:  · Wash your hands often  Use soap and water as much as possible  Use hand  that contains alcohol if soap and water are not available  Dry your hands with a clean towel or paper towel  Do not share towels with anyone  If you use paper towels, throw them away in a lined trash can kept in your room or area  Use a covered trash can, if possible  · Do not go out of your home unless it is necessary  Ask someone who is not infected to go out for groceries or supplies, or have them delivered  Do not go to your healthcare provider's office without an appointment  · Only have close physical contact with a person giving direct care, or a baby or child you must care for  Family members and friends should not visit you  If possible, stay in a separate area or room of your home if you live with others  No one should go into the area or room except to give you care  You can visit with others by phone, video chat, e-mail, or similar systems  · Wear a face covering while others are near you  This can help prevent droplets from spreading the virus when you talk, sneeze, or cough  Put the covering on before anyone comes into your room or area  Remind the person to cover his or her nose and mouth before coming in to provide care for you  · Do not share items  Do not share dishes, towels, or other items with anyone  Items need to be washed after you use them  · Protect your baby  Some newborns have tested positive for the virus  It is not known if they became infected before or after birth  The highest risk is when a  has close contact with an infected person  If you are pregnant or breastfeeding, talk to your healthcare provider or obstetrician about any concerns you have  He or she will tell you when to bring your baby in for check-ups and vaccines   He or she will also tell you what to do if you think your baby was infected with the coronavirus  Wash your hands and put on a clean face covering before you breastfeed or care for your baby  · Do not handle live animals unless it is necessary  Some animals, including pets, have been infected with the new coronavirus  Ask someone who is not infected to take care of your pet until you are well  If you must care for a pet, wear a face covering  Wash your hands before and after you give care  Talk to your healthcare provider about how to keep a service animal safe, if needed  · Follow directions from your healthcare provider for being around others after you recover  It is not known if or for how long a recovered person can pass the virus to others  Your provider may give you instructions, such as continuing social distancing and wearing a face covering  He or she will tell you when it is okay to be around others again  This may be 10 to 20 days after symptoms started or you had a positive test  Most symptoms will also need to be gone  Your provider will give you more information  Follow up with your doctor as directed:  Write down your questions so you remember to ask them during your visits  For more information:   · Centers for Disease Control and Prevention  [address] , [address]  Phone: 2- 853 - 823-3753  Web Address: [employer]    © [address] [place] [address] 2021 Information is for End User's use only and may not be sold, redistributed or otherwise used for commercial purposes  All illustrations and images included in CareNotes® are the copyrighted property of [employer] D [employer] M [employer]  or [address] [place], [place] [address]izabela   The above information is an  only  It is not intended as medical advice for individual conditions or treatments  Talk to your doctor, nurse or pharmacist before following any medical regimen to see if it is safe and effective for you  To present to the ER if symptoms worsen    Chief Complaint     Chief Complaint Patient presents with    Cough     Cough, sore throat started last night         History of Present Illness   Ravinder Vidal presents to the clinic c/o    + covid exposure 2/11  Vaccinated against covid, not boosted  Cough  This is a new problem  The current episode started yesterday (last night)  The problem has been unchanged  The problem occurs constantly  The cough is non-productive  Associated symptoms include myalgias and a sore throat  Pertinent negatives include no chest pain, chills, ear pain, eye redness, fever, headaches, rash, shortness of breath or wheezing  Nothing aggravates the symptoms  She has tried nothing for the symptoms  Her past medical history is significant for asthma  Review of Systems   Review of Systems   Constitutional: Positive for fatigue  Negative for chills, diaphoresis and fever  HENT: Positive for sore throat  Negative for congestion, ear discharge, ear pain and facial swelling  Eyes: Negative for photophobia, pain, discharge, redness, itching and visual disturbance  Respiratory: Positive for cough (dry)  Negative for apnea, chest tightness, shortness of breath and wheezing  Cardiovascular: Negative for chest pain and palpitations  Gastrointestinal: Negative for abdominal pain  Musculoskeletal: Positive for myalgias  Skin: Negative for color change, rash and wound  Neurological: Negative for dizziness and headaches  Hematological: Negative for adenopathy           Current Medications     Long-Term Medications   Medication Sig Dispense Refill    Cholecalciferol (VITAMIN D3) 2000 units capsule Take 1 tablet by mouth daily      levothyroxine 100 mcg tablet TAKE 1 TABLET DAILY 90 tablet 0    multivitamin (THERAGRAN) TABS Take 1 tablet by mouth daily         Current Allergies     Allergies as of 02/14/2022 - Reviewed 02/14/2022   Allergen Reaction Noted    Other Itching and Swelling 10/07/2020    Monosodium glutamate - food allergy Hives and Itching 2017            The following portions of the patient's history were reviewed and updated as appropriate: allergies, current medications, past family history, past medical history, past social history, past surgical history and problem list   Past Medical History:   Diagnosis Date    Disease of thyroid gland     Pinched nerve in neck     Thyroid disorder      Past Surgical History:   Procedure Laterality Date     SECTION       Social History     Socioeconomic History    Marital status: /Civil Union     Spouse name: Not on file    Number of children: Not on file    Years of education: Not on file    Highest education level: Not on file   Occupational History    Not on file   Tobacco Use    Smoking status: Never Smoker    Smokeless tobacco: Never Used   Vaping Use    Vaping Use: Never used   Substance and Sexual Activity    Alcohol use: No    Drug use: No    Sexual activity: Yes     Partners: Male   Other Topics Concern    Not on file   Social History Narrative    Always uses seat belt    Caffeine use, 2 cups of coffee per day     Social Determinants of Health     Financial Resource Strain: Not on file   Food Insecurity: Not on file   Transportation Needs: No Transportation Needs    Lack of Transportation (Medical): No    Lack of Transportation (Non-Medical): No   Physical Activity: Not on file   Stress: Not on file   Social Connections: Not on file   Intimate Partner Violence: Not on file   Housing Stability: Not on file       Objective   Pulse 100   Temp 98 2 °F (36 8 °C)   Resp 18   Ht 5' 10" (1 778 m)   Wt 113 kg (250 lb)   SpO2 98%   BMI 35 87 kg/m²      Physical Exam     Physical Exam  Vitals and nursing note reviewed  Constitutional:       General: She is not in acute distress  Appearance: She is well-developed  She is not diaphoretic  HENT:      Head: Normocephalic and atraumatic        Right Ear: Tympanic membrane and external ear normal       Left Ear: Tympanic membrane and external ear normal       Nose: Nose normal       Mouth/Throat:      Mouth: Mucous membranes are moist       Pharynx: Oropharynx is clear  Posterior oropharyngeal erythema present  No oropharyngeal exudate  Eyes:      General: No scleral icterus  Right eye: No discharge  Left eye: No discharge  Conjunctiva/sclera: Conjunctivae normal    Cardiovascular:      Rate and Rhythm: Normal rate and regular rhythm  Heart sounds: Normal heart sounds  No murmur heard  No friction rub  No gallop  Pulmonary:      Effort: Pulmonary effort is normal  No respiratory distress  Breath sounds: Normal breath sounds  No decreased breath sounds, wheezing, rhonchi or rales  Skin:     General: Skin is warm and dry  Coloration: Skin is not pale  Findings: No erythema or rash  Neurological:      Mental Status: She is alert and oriented to person, place, and time  Psychiatric:         Behavior: Behavior normal          Thought Content:  Thought content normal          Judgment: Judgment normal          Bashir Flores PA-C

## 2022-02-14 NOTE — PATIENT INSTRUCTIONS
COVID-19 (Coronavirus Disease 2019)   WHAT YOU NEED TO KNOW:   Coronavirus disease 2019 (COVID-19) is the disease caused by a coronavirus first discovered in December 2019  Coronaviruses generally cause upper respiratory (nose, throat, and lung) infections, such as a cold  The new virus spreads quickly and easily  The virus can be spread starting 2 days before symptoms even begin  The virus has also changed into several new forms (called variants) since it was discovered  The variants may be more contagious (easily spread) than the original form  Some may also cause more severe illness than others  It is important to follow local, national, and worldwide measures to protect yourself and others from infection  DISCHARGE INSTRUCTIONS:   If you think you or someone you know may be infected:  Do the following to protect others:  · If emergency care is needed,  tell the  about the possible infection, or call ahead and tell the emergency department  · Call a healthcare provider  for instructions if symptoms are mild  Anyone who may be infected should not  arrive without calling first  The provider will need to protect staff members and other patients  · The person who may be infected needs to wear a face covering  while getting medical care  This will help lower the risk of infecting others  Coverings are not used for anyone who is younger than 2 years, has breathing problems, or cannot remove it  The provider can give you instructions for anyone who cannot wear a covering  Call your local emergency number (911 in the [address] [place] [address],[address]) or an emergency department if:   · You have trouble breathing or shortness of breath at rest     · You have chest pain or pressure that lasts longer than 5 minutes  · You become confused or hard to wake  · Your lips or face are blue  · You have a fever of 104°F (40°C) or higher      Call your doctor if:   · You do not  have symptoms of COVID-19 but had close physical contact within 14 days with someone who tested positive  · You have questions or concerns about your condition or care  Medicines: You may need any of the following:  · Decongestants  help reduce nasal congestion and help you breathe more easily  If you take decongestant pills, they may make you feel restless or cause problems with your sleep  Do not use decongestant sprays for more than a few days  · Cough suppressants  help reduce coughing  Ask your healthcare provider which type of cough medicine is best for you  · Acetaminophen  decreases pain and fever  It is available without a doctor's order  Ask how much to take and how often to take it  Follow directions  Read the labels of all other medicines you are using to see if they also contain acetaminophen, or ask your doctor or pharmacist  Acetaminophen can cause liver damage if not taken correctly  Do not use more than 4 grams (4,000 milligrams) total of acetaminophen in one day  · NSAIDs , such as ibuprofen, help decrease swelling, pain, and fever  NSAIDs can cause stomach bleeding or kidney problems in certain people  If you take blood thinner medicine, always ask your healthcare provider if NSAIDs are safe for you  Always read the medicine label and follow directions  · Take your medicine as directed  Contact your healthcare provider if you think your medicine is not helping or if you have side effects  Tell him or her if you are allergic to any medicine  Keep a list of the medicines, vitamins, and herbs you take  Include the amounts, and when and why you take them  Bring the list or the pill bottles to follow-up visits  Carry your medicine list with you in case of an emergency  What you need to know about COVID-19 vaccines:  Get a vaccine even if you already had COVID-19  · COVID-19 vaccines are given as a shot in 1 or 2 doses  Some vaccines have emergency use authorization (EUA)   An EUA means the vaccine is not approved but is given because the benefits outweigh the risks  A 2-dose vaccine is fully approved for use in those 16 years or older  This vaccine also has an EUA for adolescents 12 to 15 years  Your healthcare provider can help you understand the benefits and risks  · A third dose is recommended for adults with a weakened immune system who get a 2-dose vaccine  The third dose is given at least 28 days after the second  · Even after you get the vaccine, continue social distancing and other measures  Experts are still learning how well the vaccines work to prevent infection, transmission, and severe illness  Although rare, you can become infected after you get the vaccine  You may also be able to pass the virus to others without knowing you are infected  · After you get the vaccine, check local, national, and international travel rules  Check to see if you need to be tested before you travel  You may also need to quarantine after you return  Some countries require proof of a negative test before you leave  You should also be tested 3 to 5 days after you return from another country  How the 2019 coronavirus spreads: The following are ways the virus is thought to spread, but more information may be coming:  · Droplets are the main way all coronaviruses spread  The virus travels in droplets that form when a person talks, coughs, or sneezes  The droplets can also float in the air for minutes or hours  Infection happens when you breathe in the droplets or get them in your eyes or nose  Close personal contact with an infected person increases your risk for infection  This means being within 6 feet (2 meters) of the person for at least 15 minutes over 24 hours  · Person-to-person contact can spread the virus  For example, a person with the virus on his or her hands can spread it by shaking hands with someone  · The virus can stay on objects and surfaces for a short time    You may become infected by touching the object or surface and then touching your eyes or mouth  · An infected animal may be able to infect a person who touches it  This may happen at live markets or on a farm  Help lower the risk for COVID-19:  The best way to prevent infection is to avoid anyone who is infected, but this can be hard to do  An infected person can spread the virus before signs or symptoms begin, or even if signs or symptoms never develop  The following can help lower the risk for infection:      · Wash your hands often throughout the day  Use soap and water  Rub your soapy hands together, lacing your fingers, for at least 20 seconds  Rinse with warm, running water  Dry your hands with a clean towel or paper towel  Use hand  that contains alcohol if soap and water are not available  Teach children how to wash their hands and use hand   · Cover sneezes and coughs  Turn your face away and cover your mouth and nose with a tissue  Throw the tissue away  Use the bend of your arm if a tissue is not available  Then wash your hands well with soap and water or use hand   Teach children how to cover a cough or sneeze  · Wear a face covering (mask) around anyone who does not live in your home  Use a cloth covering with at least 2 layers  You can also create layers by putting a cloth covering over a disposable non-medical mask  Cover your mouth and your nose  The covering should fit snugly against the bridge of your nose  Securely fasten it under your chin and on the sides of your face  Do not  wear a plastic face shield instead of a covering  Continue social distancing and washing your hands often  A face covering is not a substitute for social distancing safety measures  · Follow worldwide, national, and local social distancing guidelines  Keep at least 6 feet (2 meters) between you and others  Also keep this distance from anyone who comes to your home, such as someone making a delivery   Wear a face covering while you are around others  You will need to wear a covering in restaurants, stores, and other public buildings  You will also need a covering on mass transit, such as a bus, subway, or airplane  Remember to use a covering made from thick material or wear 2 coverings together  · Make a habit of not touching your face  If you get the virus on your hands, you can transfer it to your eyes, nose, or mouth and become infected  You can also transfer it to objects, surfaces, or people  Do not touch your eyes, nose, or mouth without washing your hands first     · Clean and disinfect high-touch surfaces and objects often  Use disinfecting wipes, or make a solution of 4 teaspoons of bleach in 1 quart (4 cups) of water  Clean and disinfect even if you think no one living in or coming to your home is infected with the virus  · Ask about other vaccines you may need  Get the influenza (flu) vaccine as soon as recommended each year, usually starting in September or October  Get the pneumonia vaccine if recommended  Your healthcare provider can tell you if you should also get other vaccines, and when to get them  Follow social distancing guidelines:  National and local social distancing rules vary  Rules may change over time as restrictions are lifted  Restrictions may return if an outbreak happens where you live  It is important to know and follow all current social distancing rules in your area  The following are general guidelines:  · Stay home if you are sick or think you may have COVID-19  It is important to stay home if you are waiting for a testing appointment or for test results  Even if you do not have symptoms, you can pass the virus to others  · Limit trips out of your home  Have food, medicines, and other supplies delivered and left at your door or other area, if possible  Plan trips out of your home so you make the fewest stops possible to limit close personal contact  Keep track of places you go  This will help contact tracers notify others if you become infected  · Avoid close physical contact with anyone who does not live in your home  Do not shake hands with, hug, or kiss a person as a greeting  If you must use public transportation (such as a bus or subway), try to sit or stand away from others  Only allow necessary people into your home  Wear your face covering, and remind others to wear a face covering  Remind them to wash their hands when they arrive and before they leave  Do not  let someone into your home or go to someone's home just to visit  Even if you both do not feel sick, the virus can pass from one of you to the other  · Avoid in-person gatherings and crowds  Gatherings or crowds of 10 or more individuals can cause the virus to spread  Avoid places such as [place], beaches, sporting events, and tourist attractions  For events such as parties, holiday meals, Druze services, and conferences, attend virtually (on a computer), if possible  · Ask your healthcare provider for other ways to have appointments  Some providers offer phone, video, or other types of appointments  You may also be able to get prescriptions for a few months of your medicines at a time  · Stay safe if you must go out to work  Keep physical distance between you and other workers as much as possible  Follow your employer's rules so everyone stays safe  If you have COVID-19 and are recovering at home,  healthcare providers will give you specific instructions to follow  The following are general guidelines to remind you how to keep others safe until you are well:  · Wash your hands often  Use soap and water as much as possible  Use hand  that contains alcohol if soap and water are not available  Dry your hands with a clean towel or paper towel  Do not share towels with anyone  If you use paper towels, throw them away in a lined trash can kept in your room or area   Use a covered trash can, if possible  · Do not go out of your home unless it is necessary  Ask someone who is not infected to go out for groceries or supplies, or have them delivered  Do not go to your healthcare provider's office without an appointment  · Only have close physical contact with a person giving direct care, or a baby or child you must care for  Family members and friends should not visit you  If possible, stay in a separate area or room of your home if you live with others  No one should go into the area or room except to give you care  You can visit with others by phone, video chat, e-mail, or similar systems  · Wear a face covering while others are near you  This can help prevent droplets from spreading the virus when you talk, sneeze, or cough  Put the covering on before anyone comes into your room or area  Remind the person to cover his or her nose and mouth before coming in to provide care for you  · Do not share items  Do not share dishes, towels, or other items with anyone  Items need to be washed after you use them  · Protect your baby  Some newborns have tested positive for the virus  It is not known if they became infected before or after birth  The highest risk is when a  has close contact with an infected person  If you are pregnant or breastfeeding, talk to your healthcare provider or obstetrician about any concerns you have  He or she will tell you when to bring your baby in for check-ups and vaccines  He or she will also tell you what to do if you think your baby was infected with the coronavirus  Wash your hands and put on a clean face covering before you breastfeed or care for your baby  · Do not handle live animals unless it is necessary  Some animals, including pets, have been infected with the new coronavirus  Ask someone who is not infected to take care of your pet until you are well  If you must care for a pet, wear a face covering   Wash your hands before and after you give care  Talk to your healthcare provider about how to keep a service animal safe, if needed  · Follow directions from your healthcare provider for being around others after you recover  It is not known if or for how long a recovered person can pass the virus to others  Your provider may give you instructions, such as continuing social distancing and wearing a face covering  He or she will tell you when it is okay to be around others again  This may be 10 to 20 days after symptoms started or you had a positive test  Most symptoms will also need to be gone  Your provider will give you more information  Follow up with your doctor as directed:  Write down your questions so you remember to ask them during your visits  For more information:   · Centers for Disease Control and Prevention  [address] , [address]  Phone: 7- [phone] [phone]  Web Address: One [employer] EMR br    © 2745 [facility] 2021 Information is for End User's use only and may not be sold, redistributed or otherwise used for commercial purposes  All illustrations and images included in CareNotes® are the copyrighted property of A D A M , Inc  or [facility] Nia Fenton   The above information is an  only  It is not intended as medical advice for individual conditions or treatments  Talk to your doctor, nurse or pharmacist before following any medical regimen to see if it is safe and effective for you

## 2022-02-15 LAB — SARS-COV-2 RNA RESP QL NAA+PROBE: POSITIVE

## 2022-02-18 LAB — BACTERIA THROAT CULT: ABNORMAL

## 2022-03-02 ENCOUNTER — OFFICE VISIT (OUTPATIENT)
Dept: FAMILY MEDICINE CLINIC | Facility: CLINIC | Age: 57
End: 2022-03-02
Payer: COMMERCIAL

## 2022-03-02 VITALS
HEART RATE: 93 BPM | BODY MASS INDEX: 38.04 KG/M2 | HEIGHT: 68 IN | WEIGHT: 251 LBS | OXYGEN SATURATION: 99 % | DIASTOLIC BLOOD PRESSURE: 88 MMHG | TEMPERATURE: 97.6 F | SYSTOLIC BLOOD PRESSURE: 124 MMHG

## 2022-03-02 DIAGNOSIS — E03.9 HYPOTHYROIDISM, UNSPECIFIED TYPE: ICD-10-CM

## 2022-03-02 DIAGNOSIS — R07.89 BURNING CHEST PAIN: ICD-10-CM

## 2022-03-02 DIAGNOSIS — R06.00 DOE (DYSPNEA ON EXERTION): ICD-10-CM

## 2022-03-02 DIAGNOSIS — J45.909 ASTHMA, UNSPECIFIED ASTHMA SEVERITY, UNSPECIFIED WHETHER COMPLICATED, UNSPECIFIED WHETHER PERSISTENT: ICD-10-CM

## 2022-03-02 DIAGNOSIS — Z86.16 PERSONAL HISTORY OF COVID-19: Primary | ICD-10-CM

## 2022-03-02 PROBLEM — J01.10 ACUTE FRONTAL SINUSITIS: Status: RESOLVED | Noted: 2020-04-16 | Resolved: 2022-03-02

## 2022-03-02 PROBLEM — R06.09 DOE (DYSPNEA ON EXERTION): Status: ACTIVE | Noted: 2022-03-02

## 2022-03-02 PROCEDURE — 3725F SCREEN DEPRESSION PERFORMED: CPT | Performed by: FAMILY MEDICINE

## 2022-03-02 PROCEDURE — 99214 OFFICE O/P EST MOD 30 MIN: CPT | Performed by: FAMILY MEDICINE

## 2022-03-02 RX ORDER — ALBUTEROL SULFATE 90 UG/1
2 AEROSOL, METERED RESPIRATORY (INHALATION) EVERY 6 HOURS PRN
Qty: 18 G | Refills: 1 | Status: SHIPPED | OUTPATIENT
Start: 2022-03-02

## 2022-03-02 RX ORDER — LEVOTHYROXINE SODIUM 0.1 MG/1
TABLET ORAL
Qty: 90 TABLET | Refills: 1 | Status: SHIPPED | OUTPATIENT
Start: 2022-03-02

## 2022-03-02 NOTE — PROGRESS NOTES
Assessment/Plan:         Diagnoses and all orders for this visit:    Personal history of COVID-19  Comments:  2/14/22, still with dyspnea and fatigue sx  Orders:  -     albuterol (Ventolin HFA) 90 mcg/act inhaler; Inhale 2 puffs every 6 (six) hours as needed for wheezing  -     Comprehensive metabolic panel; Future  -     CBC and differential; Future  -     NT-BNP PRO; Future  -     D-dimer, quantitative; Future  -     DEVON Screen w/ Reflex to Titer/Pattern; Future  -     C-reactive protein (CRP); Future  -     CK (with reflex to MB); Future  -     XR chest pa & lateral; Future  -     Complete PFT with post bronchodilators; Future  -     budesonide (PULMICORT) 90 MCG/ACT inhaler; Inhale 1 puff 2 (two) times a day Rinse mouth after use  Asthma, unspecified asthma severity, unspecified whether complicated, unspecified whether persistent  Comments:  prior to COVID infxtion, was mild intermittent- just during ragweed season or if she were around dust, never hospitalized for asthma, never needed more than PRN albuterol MDI;  start pulmicort BID, albuterol refill given  Orders:  -     albuterol (Ventolin HFA) 90 mcg/act inhaler; Inhale 2 puffs every 6 (six) hours as needed for wheezing  -     Comprehensive metabolic panel; Future  -     CBC and differential; Future  -     NT-BNP PRO; Future  -     D-dimer, quantitative; Future  -     DEVON Screen w/ Reflex to Titer/Pattern; Future  -     C-reactive protein (CRP); Future  -     CK (with reflex to MB); Future  -     XR chest pa & lateral; Future  -     Complete PFT with post bronchodilators; Future  -     budesonide (PULMICORT) 90 MCG/ACT inhaler; Inhale 1 puff 2 (two) times a day Rinse mouth after use  [place] (dyspnea on exertion)  Comments:  start pulmicort BID; depending on test results and pt's course, may be ordering pulmonologist saran and PASC PT/OT therapies  Orders:  -     albuterol (Ventolin HFA) 90 mcg/act inhaler;  Inhale 2 puffs every 6 (six) hours as needed for wheezing  -     Comprehensive metabolic panel; Future  -     CBC and differential; Future  -     NT-BNP PRO; Future  -     D-dimer, quantitative; Future  -     DEVON Screen w/ Reflex to Titer/Pattern; Future  -     C-reactive protein (CRP); Future  -     CK (with reflex to MB); Future  -     XR chest pa & lateral; Future  -     Complete PFT with post bronchodilators; Future  -     budesonide (PULMICORT) 90 MCG/ACT inhaler; Inhale 1 puff 2 (two) times a day Rinse mouth after use  Burning chest pain  -     Comprehensive metabolic panel; Future  -     CBC and differential; Future  -     NT-BNP PRO; Future  -     D-dimer, quantitative; Future  -     DEVON Screen w/ Reflex to Titer/Pattern; Future  -     C-reactive protein (CRP); Future  -     CK (with reflex to MB); Future  -     XR chest pa & lateral; Future  -     Complete PFT with post bronchodilators; Future    Other orders  -     Ascorbic Acid (VITAMIN C PO); Take by mouth          Subjective:   Chief Complaint   Patient presents with    COVID-19     Follow up from being Covid +   Declined flu shot  No refills needed today  Pk Avery Shortness of Breath     Post Covid     Fatigue     Post Covid   Earache     Right ear fells dull  Patient ID: Eric Montemayor is a 64 y o  female      Same day sick appt- c/o "Previously diagnosed with COVID feb 14th, still gets winded, tired, has asthma and feels it could be related"   pt usually follows with other provider here  States he has been using her rescue inhaler more than she ever has- several times a day - since COVID infection last month   "Always feels like my lungs are burning" - with any exertion and c/o "get winded talking a lot or at [employer] couldn't sing a whole song"  Still very fatigued and sleeping more, but no sleep disturbances  Denies any PND or orthopnea  States usually just ragweed season or if she were around dust would she need to use her inhaler, never needed any daily tx meds/steroid inhalers, never hospitalized for the asthma      The following portions of the patient's history were reviewed and updated as appropriate: allergies, current medications, past family history, past medical history, past social history, past surgical history and problem list     Review of Systems      Objective:      /88 (BP Location: Left arm, Patient Position: Sitting, Cuff Size: Large)   Pulse 93   Temp 97 6 °F (36 4 °C) (Tympanic)   Ht 5' 8" (1 727 m)   Wt 114 kg (251 lb)   SpO2 99%   BMI 38 16 kg/m²          Physical Exam  Constitutional:       General: She is not in acute distress  Appearance: She is well-developed  She is not ill-appearing, toxic-appearing or diaphoretic  HENT:      Head: Normocephalic and atraumatic  Right Ear: Tympanic membrane, ear canal and external ear normal       Left Ear: Tympanic membrane, ear canal and external ear normal       Nose: Nose normal       Mouth/Throat:      Mouth: Mucous membranes are moist       Pharynx: Oropharynx is clear  Uvula midline  Neck:      Vascular: No JVD  Trachea: Trachea and phonation normal    Cardiovascular:      Rate and Rhythm: Normal rate and regular rhythm  Pulses: Normal pulses  Heart sounds: Normal heart sounds  Pulmonary:      Effort: Pulmonary effort is normal       Breath sounds: Normal air entry  No decreased breath sounds, wheezing or rhonchi  Comments: Few bibasilar crackles  Musculoskeletal:      Cervical back: Neck supple  Right lower leg: No edema  Left lower leg: No edema  Skin:     General: Skin is warm and dry  Coloration: Skin is not pale  Neurological:      Mental Status: She is alert and oriented to person, place, and time  Psychiatric:         Mood and Affect: Mood normal          Behavior: Behavior normal  Behavior is cooperative

## 2022-03-03 ENCOUNTER — APPOINTMENT (OUTPATIENT)
Dept: RADIOLOGY | Facility: CLINIC | Age: 57
End: 2022-03-03
Payer: COMMERCIAL

## 2022-03-03 DIAGNOSIS — Z86.16 PERSONAL HISTORY OF COVID-19: ICD-10-CM

## 2022-03-03 DIAGNOSIS — R06.00 DOE (DYSPNEA ON EXERTION): ICD-10-CM

## 2022-03-03 DIAGNOSIS — R07.89 BURNING CHEST PAIN: ICD-10-CM

## 2022-03-03 DIAGNOSIS — J45.909 ASTHMA, UNSPECIFIED ASTHMA SEVERITY, UNSPECIFIED WHETHER COMPLICATED, UNSPECIFIED WHETHER PERSISTENT: ICD-10-CM

## 2022-03-03 PROCEDURE — 71046 X-RAY EXAM CHEST 2 VIEWS: CPT

## 2022-03-04 ENCOUNTER — APPOINTMENT (OUTPATIENT)
Dept: LAB | Facility: CLINIC | Age: 57
End: 2022-03-04
Payer: COMMERCIAL

## 2022-03-04 DIAGNOSIS — J45.909 ASTHMA, UNSPECIFIED ASTHMA SEVERITY, UNSPECIFIED WHETHER COMPLICATED, UNSPECIFIED WHETHER PERSISTENT: ICD-10-CM

## 2022-03-04 DIAGNOSIS — R06.00 DOE (DYSPNEA ON EXERTION): ICD-10-CM

## 2022-03-04 DIAGNOSIS — Z86.16 PERSONAL HISTORY OF COVID-19: ICD-10-CM

## 2022-03-04 DIAGNOSIS — R07.89 BURNING CHEST PAIN: ICD-10-CM

## 2022-03-04 LAB
ALBUMIN SERPL BCP-MCNC: 3.8 G/DL (ref 3.5–5)
ALP SERPL-CCNC: 65 U/L (ref 46–116)
ALT SERPL W P-5'-P-CCNC: 50 U/L (ref 12–78)
ANION GAP SERPL CALCULATED.3IONS-SCNC: 3 MMOL/L (ref 4–13)
AST SERPL W P-5'-P-CCNC: 20 U/L (ref 5–45)
BASOPHILS # BLD AUTO: 0.04 THOUSANDS/ΜL (ref 0–0.1)
BASOPHILS NFR BLD AUTO: 1 % (ref 0–1)
BILIRUB SERPL-MCNC: 0.57 MG/DL (ref 0.2–1)
BUN SERPL-MCNC: 16 MG/DL (ref 5–25)
CALCIUM SERPL-MCNC: 9.4 MG/DL (ref 8.3–10.1)
CHLORIDE SERPL-SCNC: 109 MMOL/L (ref 100–108)
CK SERPL-CCNC: 108 U/L (ref 26–192)
CO2 SERPL-SCNC: 28 MMOL/L (ref 21–32)
CREAT SERPL-MCNC: 0.79 MG/DL (ref 0.6–1.3)
CRP SERPL QL: 7.2 MG/L
D DIMER PPP FEU-MCNC: 0.28 UG/ML FEU
EOSINOPHIL # BLD AUTO: 0.17 THOUSAND/ΜL (ref 0–0.61)
EOSINOPHIL NFR BLD AUTO: 3 % (ref 0–6)
ERYTHROCYTE [DISTWIDTH] IN BLOOD BY AUTOMATED COUNT: 11.9 % (ref 11.6–15.1)
GFR SERPL CREATININE-BSD FRML MDRD: 83 ML/MIN/1.73SQ M
GLUCOSE P FAST SERPL-MCNC: 97 MG/DL (ref 65–99)
HCT VFR BLD AUTO: 43.9 % (ref 34.8–46.1)
HGB BLD-MCNC: 14.1 G/DL (ref 11.5–15.4)
IMM GRANULOCYTES # BLD AUTO: 0.02 THOUSAND/UL (ref 0–0.2)
IMM GRANULOCYTES NFR BLD AUTO: 0 % (ref 0–2)
LYMPHOCYTES # BLD AUTO: 1.51 THOUSANDS/ΜL (ref 0.6–4.47)
LYMPHOCYTES NFR BLD AUTO: 26 % (ref 14–44)
MCH RBC QN AUTO: 30 PG (ref 26.8–34.3)
MCHC RBC AUTO-ENTMCNC: 32.1 G/DL (ref 31.4–37.4)
MCV RBC AUTO: 93 FL (ref 82–98)
MONOCYTES # BLD AUTO: 0.32 THOUSAND/ΜL (ref 0.17–1.22)
MONOCYTES NFR BLD AUTO: 6 % (ref 4–12)
NEUTROPHILS # BLD AUTO: 3.69 THOUSANDS/ΜL (ref 1.85–7.62)
NEUTS SEG NFR BLD AUTO: 64 % (ref 43–75)
NRBC BLD AUTO-RTO: 0 /100 WBCS
NT-PROBNP SERPL-MCNC: 40 PG/ML
PLATELET # BLD AUTO: 253 THOUSANDS/UL (ref 149–390)
PMV BLD AUTO: 10.9 FL (ref 8.9–12.7)
POTASSIUM SERPL-SCNC: 4.5 MMOL/L (ref 3.5–5.3)
PROT SERPL-MCNC: 7.6 G/DL (ref 6.4–8.2)
RBC # BLD AUTO: 4.7 MILLION/UL (ref 3.81–5.12)
SODIUM SERPL-SCNC: 140 MMOL/L (ref 136–145)
WBC # BLD AUTO: 5.75 THOUSAND/UL (ref 4.31–10.16)

## 2022-03-04 PROCEDURE — 86140 C-REACTIVE PROTEIN: CPT

## 2022-03-04 PROCEDURE — 85025 COMPLETE CBC W/AUTO DIFF WBC: CPT

## 2022-03-04 PROCEDURE — 80053 COMPREHEN METABOLIC PANEL: CPT

## 2022-03-04 PROCEDURE — 85379 FIBRIN DEGRADATION QUANT: CPT

## 2022-03-04 PROCEDURE — 86038 ANTINUCLEAR ANTIBODIES: CPT

## 2022-03-04 PROCEDURE — 36415 COLL VENOUS BLD VENIPUNCTURE: CPT

## 2022-03-04 PROCEDURE — 83880 ASSAY OF NATRIURETIC PEPTIDE: CPT

## 2022-03-04 PROCEDURE — 82550 ASSAY OF CK (CPK): CPT

## 2022-03-07 LAB — RYE IGE QN: NEGATIVE

## 2022-03-09 ENCOUNTER — HOSPITAL ENCOUNTER (OUTPATIENT)
Dept: PULMONOLOGY | Facility: HOSPITAL | Age: 57
Discharge: HOME | End: 2022-03-09
Payer: COMMERCIAL

## 2022-03-09 DIAGNOSIS — Z86.16 PERSONAL HISTORY OF COVID-19: ICD-10-CM

## 2022-03-09 DIAGNOSIS — J45.909 ASTHMA, UNSPECIFIED ASTHMA SEVERITY, UNSPECIFIED WHETHER COMPLICATED, UNSPECIFIED WHETHER PERSISTENT: ICD-10-CM

## 2022-03-09 DIAGNOSIS — R07.89 BURNING CHEST PAIN: ICD-10-CM

## 2022-03-09 DIAGNOSIS — R06.00 DOE (DYSPNEA ON EXERTION): ICD-10-CM

## 2022-03-09 PROCEDURE — 94729 DIFFUSING CAPACITY: CPT

## 2022-03-09 PROCEDURE — 94060 EVALUATION OF WHEEZING: CPT

## 2022-03-09 PROCEDURE — 94726 PLETHYSMOGRAPHY LUNG VOLUMES: CPT | Performed by: INTERNAL MEDICINE

## 2022-03-09 PROCEDURE — 94729 DIFFUSING CAPACITY: CPT | Performed by: INTERNAL MEDICINE

## 2022-03-09 PROCEDURE — 94060 EVALUATION OF WHEEZING: CPT | Performed by: INTERNAL MEDICINE

## 2022-03-09 PROCEDURE — 94760 N-INVAS EAR/PLS OXIMETRY 1: CPT

## 2022-03-09 PROCEDURE — 94726 PLETHYSMOGRAPHY LUNG VOLUMES: CPT

## 2022-03-09 RX ORDER — ALBUTEROL SULFATE 2.5 MG/3ML
2.5 SOLUTION RESPIRATORY (INHALATION) ONCE AS NEEDED
Status: COMPLETED | OUTPATIENT
Start: 2022-03-09 | End: 2022-03-09

## 2022-03-09 RX ADMIN — ALBUTEROL SULFATE 2.5 MG: 2.5 SOLUTION RESPIRATORY (INHALATION) at 08:30

## 2022-03-17 ENCOUNTER — VBI (OUTPATIENT)
Dept: ADMINISTRATIVE | Facility: OTHER | Age: 57
End: 2022-03-17

## 2022-03-22 ENCOUNTER — OFFICE VISIT (OUTPATIENT)
Dept: FAMILY MEDICINE CLINIC | Facility: CLINIC | Age: 57
End: 2022-03-22
Payer: COMMERCIAL

## 2022-03-22 VITALS
SYSTOLIC BLOOD PRESSURE: 124 MMHG | OXYGEN SATURATION: 96 % | TEMPERATURE: 98.6 F | DIASTOLIC BLOOD PRESSURE: 82 MMHG | BODY MASS INDEX: 37.74 KG/M2 | HEIGHT: 68 IN | WEIGHT: 249 LBS | HEART RATE: 72 BPM

## 2022-03-22 DIAGNOSIS — R06.00 DOE (DYSPNEA ON EXERTION): ICD-10-CM

## 2022-03-22 DIAGNOSIS — Z11.59 NEED FOR HEPATITIS C SCREENING TEST: ICD-10-CM

## 2022-03-22 DIAGNOSIS — R94.2 RESTRICTIVE VENTILATORY DEFECT: ICD-10-CM

## 2022-03-22 DIAGNOSIS — J45.909 ASTHMA, UNSPECIFIED ASTHMA SEVERITY, UNSPECIFIED WHETHER COMPLICATED, UNSPECIFIED WHETHER PERSISTENT: Primary | ICD-10-CM

## 2022-03-22 PROCEDURE — 99214 OFFICE O/P EST MOD 30 MIN: CPT | Performed by: FAMILY MEDICINE

## 2022-03-22 PROCEDURE — 3008F BODY MASS INDEX DOCD: CPT | Performed by: FAMILY MEDICINE

## 2022-03-22 PROCEDURE — 1036F TOBACCO NON-USER: CPT | Performed by: FAMILY MEDICINE

## 2022-03-22 NOTE — PROGRESS NOTES
Assessment/Plan:         Diagnoses and all orders for this visit:    Asthma, unspecified asthma severity, unspecified whether complicated, unspecified whether persistent    Restrictive ventilatory defect  Comments:  suspect due to COVID, respiratory symptoms improving on steroid inhaler, cpm and monitoring    WORTH[place] (dyspnea on exertion)  Comments:  symptoms improved, cpm and monitoring    Need for hepatitis C screening test  -     Hepatitis C Antibody ([facility]); Future          Subjective:   Chief Complaint   Patient presents with    Follow-up     Covid+   No concerns today  Declined flu shot  No refills needed today  Patient ID: Ahsan Roman is a 64 y o  female      Here for short interval f/u of a same day sick appt 2 weeks ago where she c/o persistent post-COVID WORTH[place], fatigue, and burning chest/lung pains with known hx asthma   Labs, CXR, and PFT were ordered and she was started on pulmicort  Mildly elevated CRP, otherwise labs ok, CXR normal, PFT showing asthma plus restrictive defect  States, "I am starting to feel better- back to myself- not going to bed at 7 o'clock at night- emptying the  am able to do without getting exhausted, still using rescue inhaler more than had been before COVID, but it's better"  "Coughing a whole lot less"  Pt defers mammo order and pneumovax today    Pulmonary Function Test Report  Date of Testing: 3/9/2022  Results:  FEV1/FVC Ratio: 71 %  Forced Vital Capacity: 2 57 L    68 % predicted  FEV1: 1 83 L     61 % predicted  After administration of bronchodilator FEV1: increased 22% 410cc  Lung volumes by body plethysmography: Total Lung Capacity 85 % predicted Residual volume 107 % predicted  MVV 58L, 57%, MEP [id number] (62%), MIP -[id number] (76%)  DLCO corrected for patients hemoglobin level: 89 %  Interpretation:  · Moderate restrictive airflow defect on spirometry with post bronchodilator revealing mild obstructive airflow defect likely secondary to improved air trapping  · Improved with the administration of bronchodilator per ATS standards  · Normal Lung volumes  · Normal Diffusion  · Normal flow volume loops         The following portions of the patient's history were reviewed and updated as appropriate: allergies, current medications, past family history, past medical history, past social history, past surgical history and problem list     Review of Systems      Objective:      /82 (BP Location: Left arm, Patient Position: Sitting, Cuff Size: Large)   Pulse 72   Temp 98 6 °F (37 °C) (Tympanic)   Ht 5' 8" (1 727 m)   Wt 113 kg (249 lb)   SpO2 96%   BMI 37 86 kg/m²          Physical Exam  Constitutional:       General: She is not in acute distress  Appearance: She is well-developed  She is not ill-appearing, toxic-appearing or diaphoretic  HENT:      Head: Normocephalic and atraumatic  Mouth/Throat:      Pharynx: Uvula midline  Neck:      Trachea: Phonation normal    Cardiovascular:      Rate and Rhythm: Normal rate and regular rhythm  Pulses: Normal pulses  Heart sounds: Normal heart sounds  Pulmonary:      Effort: Pulmonary effort is normal    Musculoskeletal:      Right lower leg: No edema  Left lower leg: No edema  Skin:     General: Skin is warm and dry  Capillary Refill: Capillary refill takes less than 2 seconds  Coloration: Skin is not pale  Neurological:      Mental Status: She is alert and oriented to person, place, and time  Gait: Gait normal    Psychiatric:         Mood and Affect: Mood normal          Behavior: Behavior normal  Behavior is cooperative

## 2022-03-25 PROBLEM — R94.2 RESTRICTIVE VENTILATORY DEFECT: Status: ACTIVE | Noted: 2022-03-25

## 2022-04-21 ENCOUNTER — VBI (OUTPATIENT)
Dept: ADMINISTRATIVE | Facility: OTHER | Age: 57
End: 2022-04-21

## 2022-08-15 ENCOUNTER — OFFICE VISIT (OUTPATIENT)
Dept: FAMILY MEDICINE CLINIC | Facility: CLINIC | Age: 57
End: 2022-08-15
Payer: COMMERCIAL

## 2022-08-15 VITALS
HEART RATE: 58 BPM | HEIGHT: 68 IN | SYSTOLIC BLOOD PRESSURE: 118 MMHG | TEMPERATURE: 97.8 F | OXYGEN SATURATION: 99 % | WEIGHT: 243.6 LBS | BODY MASS INDEX: 36.92 KG/M2 | DIASTOLIC BLOOD PRESSURE: 90 MMHG

## 2022-08-15 DIAGNOSIS — J45.909 ASTHMA, UNSPECIFIED ASTHMA SEVERITY, UNSPECIFIED WHETHER COMPLICATED, UNSPECIFIED WHETHER PERSISTENT: ICD-10-CM

## 2022-08-15 DIAGNOSIS — R06.02 SHORTNESS OF BREATH: ICD-10-CM

## 2022-08-15 DIAGNOSIS — Z86.16 PERSONAL HISTORY OF COVID-19: ICD-10-CM

## 2022-08-15 DIAGNOSIS — R06.09 DOE (DYSPNEA ON EXERTION): ICD-10-CM

## 2022-08-15 DIAGNOSIS — R05.9 COUGH: Primary | ICD-10-CM

## 2022-08-15 PROCEDURE — 99214 OFFICE O/P EST MOD 30 MIN: CPT | Performed by: FAMILY MEDICINE

## 2022-08-15 RX ORDER — ALBUTEROL SULFATE 90 UG/1
2 AEROSOL, METERED RESPIRATORY (INHALATION) EVERY 4 HOURS PRN
Qty: 18 G | Refills: 1 | Status: SHIPPED | OUTPATIENT
Start: 2022-08-15

## 2022-08-15 RX ORDER — AZITHROMYCIN 250 MG/1
TABLET, FILM COATED ORAL
Qty: 6 TABLET | Refills: 0 | Status: SHIPPED | OUTPATIENT
Start: 2022-08-15 | End: 2022-08-19

## 2022-08-15 NOTE — PROGRESS NOTES
Assessment/Plan:       Diagnoses and all orders for this visit:    Cough  -     azithromycin (ZITHROMAX) 250 mg tablet; Take 2 tablets today then 1 tablet daily x 4 days  -     XR chest pa & lateral; Future    Shortness of breath  -     azithromycin (ZITHROMAX) 250 mg tablet; Take 2 tablets today then 1 tablet daily x 4 days  -     XR chest pa & lateral; Future    Personal history of COVID-19  Comments:  2/14/22, still with dyspnea and fatigue sx  Orders:  -     albuterol (Ventolin HFA) 90 mcg/act inhaler; Inhale 2 puffs every 4 (four) hours as needed for wheezing or shortness of breath    Asthma, unspecified asthma severity, unspecified whether complicated, unspecified whether persistent  Comments:  prior to COVID infxtion, was mild intermittent- just during ragweed season or if she were around dust, never hospitalized for asthma, never needed more than PRN  Orders:  -     albuterol (Ventolin HFA) 90 mcg/act inhaler; Inhale 2 puffs every 4 (four) hours as needed for wheezing or shortness of breath    [place] (dyspnea on exertion)  Comments:  start pulmicort BID; depending on test results and pt's course, may be ordering pulmonologist eval and [facility]  Orders:  -     albuterol (Ventolin HFA) 90 mcg/act inhaler; Inhale 2 puffs every 4 (four) hours as needed for wheezing or shortness of breath          Subjective:   Chief Complaint   Patient presents with    Asthma     Cold last week, did covid test is was neg, everything is sitting in her lungs, can't breath        Patient ID: Claire Hazel is a 64 y o  female  Same day sick appt - pt c/o "I have a cold  I home tested twice and I am negative   I am feeling better but my lungs are hurting and I am having a hard time to breathe "  Pulse ox normal/excellent, pt is afebrile  "Last week started with cold symptoms - on last Saturday, the nose opened and can breathe through nose, but can't breathe- have a dry/hacking cough, lungs are hurting"  Initially had greenish sputum, now dry cough pr pt  Had COVID in February, "and it was bad with my lungs, wonder if that's why it's sticking in my lungs like this now"  "Was using dayquil/nyquil until sinuses cleared up, right now just using my inhaler- and far more often than what it says every 4 hours"  Had normal CXR in March  Pt is returning to work after visit/has not missed work due to these recent sx      The following portions of the patient's history were reviewed and updated as appropriate: allergies, current medications, past family history, past medical history, past social history, past surgical history and problem list     Review of Systems      Objective:      /90 (BP Location: Left arm, Patient Position: Sitting, Cuff Size: Large)   Pulse 58   Temp 97 8 °F (36 6 °C) (Tympanic)   Ht 5' 8" (1 727 m)   Wt 110 kg (243 lb 9 6 oz)   SpO2 99%   BMI 37 04 kg/m²          Physical Exam  Vitals and nursing note reviewed  Constitutional:       General: She is not in acute distress  Appearance: She is well-developed  She is not ill-appearing, toxic-appearing or diaphoretic  HENT:      Head: Normocephalic and atraumatic  Eyes:      General: Lids are normal       Conjunctiva/sclera: Conjunctivae normal       Pupils: Pupils are equal, round, and reactive to light  Neck:      Thyroid: No thyroid mass or thyromegaly  Vascular: No JVD  Trachea: Trachea normal    Cardiovascular:      Rate and Rhythm: Normal rate and regular rhythm  Heart sounds: Normal heart sounds  Comments: No edema  Pulmonary:      Effort: Pulmonary effort is normal       Breath sounds: Normal breath sounds and air entry  Abdominal:      General: Bowel sounds are normal  There is no distension  Palpations: Abdomen is soft  There is no hepatomegaly, splenomegaly or mass  Tenderness: There is no abdominal tenderness  Musculoskeletal:      Cervical back: Neck supple     Lymphadenopathy:      Cervical: No cervical adenopathy  Skin:     General: Skin is warm and dry  Capillary Refill: Capillary refill takes less than 2 seconds  Coloration: Skin is not pale  Neurological:      Mental Status: She is alert and oriented to person, place, and time  Gait: Gait normal    Psychiatric:         Mood and Affect: Mood normal          Behavior: Behavior normal  Behavior is cooperative

## 2022-08-16 ENCOUNTER — TELEPHONE (OUTPATIENT)
Dept: FAMILY MEDICINE CLINIC | Facility: CLINIC | Age: 57
End: 2022-08-16

## 2022-08-16 ENCOUNTER — APPOINTMENT (OUTPATIENT)
Dept: RADIOLOGY | Facility: CLINIC | Age: 57
End: 2022-08-16
Payer: COMMERCIAL

## 2022-08-16 DIAGNOSIS — J45.909 ASTHMA, UNSPECIFIED ASTHMA SEVERITY, UNSPECIFIED WHETHER COMPLICATED, UNSPECIFIED WHETHER PERSISTENT: ICD-10-CM

## 2022-08-16 DIAGNOSIS — Z86.16 PERSONAL HISTORY OF COVID-19: ICD-10-CM

## 2022-08-16 DIAGNOSIS — R05.9 COUGH: ICD-10-CM

## 2022-08-16 DIAGNOSIS — R06.02 SHORTNESS OF BREATH: ICD-10-CM

## 2022-08-16 DIAGNOSIS — R06.09 DOE (DYSPNEA ON EXERTION): ICD-10-CM

## 2022-08-16 PROCEDURE — 71046 X-RAY EXAM CHEST 2 VIEWS: CPT

## 2022-08-16 NOTE — TELEPHONE ENCOUNTER
Patient called regarding her visit yesterday  She was under the impression you were sending a script for the Pulmicort inhaler  She got the albuterol inhaler but she said it does not help that much and she recalls you asking her if the Pulmicort helped and if it was covered by her insurance  She said it is so she is asking if you can send a script for this to her pharmacy  Please advise  Thanks!

## 2022-08-16 NOTE — TELEPHONE ENCOUNTER
I had advised pt at visit yesterday that would await result of CXR, and if not showing pneumonic process/abnormality, then I would send in rx for the pulmicort inhaler  Her CXR was just read at 4:23 PM today (after pt's phone call message)    Please let her know that CXR looks good, and I am escribing the pulmicort

## 2022-08-29 DIAGNOSIS — E03.9 HYPOTHYROIDISM, UNSPECIFIED TYPE: ICD-10-CM

## 2022-08-29 RX ORDER — LEVOTHYROXINE SODIUM 0.1 MG/1
TABLET ORAL
Qty: 90 TABLET | Refills: 3 | Status: SHIPPED | OUTPATIENT
Start: 2022-08-29

## 2022-11-08 ENCOUNTER — VBI (OUTPATIENT)
Dept: ADMINISTRATIVE | Facility: OTHER | Age: 57
End: 2022-11-08

## 2022-11-28 ENCOUNTER — VBI (OUTPATIENT)
Dept: ADMINISTRATIVE | Facility: OTHER | Age: 57
End: 2022-11-28

## 2023-07-12 ENCOUNTER — VBI (OUTPATIENT)
Dept: ADMINISTRATIVE | Facility: OTHER | Age: 58
End: 2023-07-12

## 2023-08-24 DIAGNOSIS — E03.9 HYPOTHYROIDISM, UNSPECIFIED TYPE: ICD-10-CM

## 2023-08-24 NOTE — TELEPHONE ENCOUNTER
Requested medication(s) are due for refill today: Yes  Patient has already received a courtesy refill: No  Other reason request has been forwarded to provider: Refill protocol failed    Last seen 08-

## 2023-08-25 DIAGNOSIS — E78.5 HYPERLIPIDEMIA, UNSPECIFIED HYPERLIPIDEMIA TYPE: ICD-10-CM

## 2023-08-25 DIAGNOSIS — E03.9 HYPOTHYROIDISM, UNSPECIFIED TYPE: Primary | ICD-10-CM

## 2023-08-25 DIAGNOSIS — Z13.0 SCREENING FOR DEFICIENCY ANEMIA: ICD-10-CM

## 2023-08-25 RX ORDER — LEVOTHYROXINE SODIUM 0.1 MG/1
TABLET ORAL
Qty: 90 TABLET | Refills: 0 | Status: SHIPPED | OUTPATIENT
Start: 2023-08-25

## 2023-08-25 NOTE — TELEPHONE ENCOUNTER
Was Ana's pt- chart review shows that pt is long overdue for f/u and long overdue for labs- I placed orders for labwork- please call to schedule appt ASAP

## 2023-09-08 ENCOUNTER — APPOINTMENT (OUTPATIENT)
Dept: LAB | Facility: CLINIC | Age: 58
End: 2023-09-08
Payer: COMMERCIAL

## 2023-09-08 DIAGNOSIS — Z13.0 SCREENING FOR DEFICIENCY ANEMIA: ICD-10-CM

## 2023-09-08 DIAGNOSIS — E03.9 HYPOTHYROIDISM, UNSPECIFIED TYPE: ICD-10-CM

## 2023-09-08 DIAGNOSIS — E78.5 HYPERLIPIDEMIA, UNSPECIFIED HYPERLIPIDEMIA TYPE: ICD-10-CM

## 2023-09-08 LAB
ALBUMIN SERPL BCP-MCNC: 4.2 G/DL (ref 3.5–5)
ALP SERPL-CCNC: 58 U/L (ref 34–104)
ALT SERPL W P-5'-P-CCNC: 36 U/L (ref 7–52)
ANION GAP SERPL CALCULATED.3IONS-SCNC: 6 MMOL/L
AST SERPL W P-5'-P-CCNC: 24 U/L (ref 13–39)
BASOPHILS # BLD AUTO: 0.05 THOUSANDS/ÂΜL (ref 0–0.1)
BASOPHILS NFR BLD AUTO: 1 % (ref 0–1)
BILIRUB SERPL-MCNC: 0.6 MG/DL (ref 0.2–1)
BUN SERPL-MCNC: 15 MG/DL (ref 5–25)
CALCIUM SERPL-MCNC: 9.7 MG/DL (ref 8.4–10.2)
CHLORIDE SERPL-SCNC: 104 MMOL/L (ref 96–108)
CHOLEST SERPL-MCNC: 194 MG/DL
CO2 SERPL-SCNC: 32 MMOL/L (ref 21–32)
CREAT SERPL-MCNC: 0.79 MG/DL (ref 0.6–1.3)
EOSINOPHIL # BLD AUTO: 0.29 THOUSAND/ÂΜL (ref 0–0.61)
EOSINOPHIL NFR BLD AUTO: 5 % (ref 0–6)
ERYTHROCYTE [DISTWIDTH] IN BLOOD BY AUTOMATED COUNT: 11.8 % (ref 11.6–15.1)
GFR SERPL CREATININE-BSD FRML MDRD: 83 ML/MIN/1.73SQ M
GLUCOSE P FAST SERPL-MCNC: 98 MG/DL (ref 65–99)
HCT VFR BLD AUTO: 44.1 % (ref 34.8–46.1)
HDLC SERPL-MCNC: 52 MG/DL
HGB BLD-MCNC: 14.8 G/DL (ref 11.5–15.4)
IMM GRANULOCYTES # BLD AUTO: 0.01 THOUSAND/UL (ref 0–0.2)
IMM GRANULOCYTES NFR BLD AUTO: 0 % (ref 0–2)
LDLC SERPL CALC-MCNC: 128 MG/DL (ref 0–100)
LYMPHOCYTES # BLD AUTO: 1.52 THOUSANDS/ÂΜL (ref 0.6–4.47)
LYMPHOCYTES NFR BLD AUTO: 28 % (ref 14–44)
MCH RBC QN AUTO: 30.4 PG (ref 26.8–34.3)
MCHC RBC AUTO-ENTMCNC: 33.6 G/DL (ref 31.4–37.4)
MCV RBC AUTO: 91 FL (ref 82–98)
MONOCYTES # BLD AUTO: 0.37 THOUSAND/ÂΜL (ref 0.17–1.22)
MONOCYTES NFR BLD AUTO: 7 % (ref 4–12)
NEUTROPHILS # BLD AUTO: 3.12 THOUSANDS/ÂΜL (ref 1.85–7.62)
NEUTS SEG NFR BLD AUTO: 59 % (ref 43–75)
NONHDLC SERPL-MCNC: 142 MG/DL
NRBC BLD AUTO-RTO: 0 /100 WBCS
PLATELET # BLD AUTO: 260 THOUSANDS/UL (ref 149–390)
PMV BLD AUTO: 10.8 FL (ref 8.9–12.7)
POTASSIUM SERPL-SCNC: 4.5 MMOL/L (ref 3.5–5.3)
PROT SERPL-MCNC: 7 G/DL (ref 6.4–8.4)
RBC # BLD AUTO: 4.87 MILLION/UL (ref 3.81–5.12)
SODIUM SERPL-SCNC: 142 MMOL/L (ref 135–147)
T4 FREE SERPL-MCNC: 1.25 NG/DL (ref 0.61–1.12)
TRIGL SERPL-MCNC: 72 MG/DL
TSH SERPL DL<=0.05 MIU/L-ACNC: 4.04 UIU/ML (ref 0.45–4.5)
WBC # BLD AUTO: 5.36 THOUSAND/UL (ref 4.31–10.16)

## 2023-09-08 PROCEDURE — 80053 COMPREHEN METABOLIC PANEL: CPT

## 2023-09-08 PROCEDURE — 84443 ASSAY THYROID STIM HORMONE: CPT

## 2023-09-08 PROCEDURE — 36415 COLL VENOUS BLD VENIPUNCTURE: CPT

## 2023-09-08 PROCEDURE — 80061 LIPID PANEL: CPT

## 2023-09-08 PROCEDURE — 84439 ASSAY OF FREE THYROXINE: CPT

## 2023-09-08 PROCEDURE — 85025 COMPLETE CBC W/AUTO DIFF WBC: CPT

## 2023-09-12 ENCOUNTER — VBI (OUTPATIENT)
Dept: ADMINISTRATIVE | Facility: OTHER | Age: 58
End: 2023-09-12

## 2023-10-10 ENCOUNTER — OFFICE VISIT (OUTPATIENT)
Dept: FAMILY MEDICINE CLINIC | Facility: CLINIC | Age: 58
End: 2023-10-10
Payer: COMMERCIAL

## 2023-10-10 VITALS
DIASTOLIC BLOOD PRESSURE: 87 MMHG | OXYGEN SATURATION: 99 % | HEIGHT: 71 IN | BODY MASS INDEX: 36.03 KG/M2 | WEIGHT: 257.4 LBS | TEMPERATURE: 97.6 F | SYSTOLIC BLOOD PRESSURE: 138 MMHG | HEART RATE: 69 BPM

## 2023-10-10 DIAGNOSIS — Z12.31 BREAST CANCER SCREENING BY MAMMOGRAM: ICD-10-CM

## 2023-10-10 DIAGNOSIS — E78.5 HYPERLIPIDEMIA, UNSPECIFIED HYPERLIPIDEMIA TYPE: ICD-10-CM

## 2023-10-10 DIAGNOSIS — Z12.4 SCREENING FOR MALIGNANT NEOPLASM OF THE CERVIX: ICD-10-CM

## 2023-10-10 DIAGNOSIS — J45.909 ASTHMA, UNSPECIFIED ASTHMA SEVERITY, UNSPECIFIED WHETHER COMPLICATED, UNSPECIFIED WHETHER PERSISTENT: ICD-10-CM

## 2023-10-10 DIAGNOSIS — D22.9 CHANGE IN MOLE: ICD-10-CM

## 2023-10-10 DIAGNOSIS — E03.9 HYPOTHYROIDISM, UNSPECIFIED TYPE: Primary | ICD-10-CM

## 2023-10-10 PROBLEM — R06.09 DOE (DYSPNEA ON EXERTION): Status: RESOLVED | Noted: 2022-03-02 | Resolved: 2023-10-10

## 2023-10-10 PROCEDURE — 99214 OFFICE O/P EST MOD 30 MIN: CPT | Performed by: FAMILY MEDICINE

## 2023-10-10 RX ORDER — ALBUTEROL SULFATE 90 UG/1
2 AEROSOL, METERED RESPIRATORY (INHALATION) EVERY 4 HOURS PRN
Qty: 18 G | Refills: 1 | Status: SHIPPED | OUTPATIENT
Start: 2023-10-10

## 2023-10-10 NOTE — PROGRESS NOTES
Name: Tsering Almonte      : 1965      MRN: [id number]  Encounter Provider: River Frazier DO  Encounter Date: 10/10/2023   Encounter department: [address] [place] [address]     1. Hypothyroidism, unspecified type  -     TSH, 3rd generation with Free T4 reflex; Future; Expected date: 2023  -     T4, free; Future; Expected date: 2023    2. Hyperlipidemia, unspecified hyperlipidemia type    3. Change in mole  Comments:  right lower leg  Orders:  -     Ambulatory Referral to Dermatology; Future    4. Screening for malignant neoplasm of the cervix  -     Ambulatory Referral to Gynecology; Future    5. Breast cancer screening by mammogram  -     Mammo screening bilateral w 3d & cad; Future; Expected date: 10/10/2023    6. Asthma, unspecified asthma severity, unspecified whether complicated, unspecified whether persistent  -     albuterol (Ventolin HFA) 90 mcg/act inhaler; Inhale 2 puffs every 4 (four) hours as needed for wheezing or shortness of breath    labs last month - TSH upper level normal range, T4 very slightly above range - Pt denies any sx - will recheck in approx 1-2months and adjust synthroid dose if necessary      Depression Screening and Follow-up Plan: Patient was screened for depression during today's encounter. They screened negative with a PHQ-2 score of 0.       Subjective      Chief Complaint   Patient presents with   • Medication Refill     Med refills       Pt scheduled this appt after informed in relation to med refill request in August that she is long overdue for f/u and long overdue for labs - orders were placed for labwork  Pt had followed with other clinician here who left practice  Did obtain labs last month, and TSH upper level normal range, T4 very slightly above range  Pt denies any sx  She is overdue for mammo and GYN exam - states her prev GYN retired  Pt states, "It's time to get my mom to move in with us"  Reports, "Started milling my own flour - all different grains - and feel so much better - have made my own bread for a long time- used to have that chicken skin and that's gone - my grandson had it, too, and went to his pediatrician who told my daughter in law it's not a gluten allergy but how the wheat is processed"  Bp 150/98 on rooming - admits is taking decongestants due to allergies flaring up - using her inhaler more- admits needs this refilled - bp is better on recheck    Review of Systems   Constitutional: Negative. Respiratory: Negative. Cardiovascular: Negative. Skin:         Has noticed right lower leg mole has changed color       Current Outpatient Medications on File Prior to Visit   Medication Sig   • Ascorbic Acid (VITAMIN C PO) Take by mouth   • Cholecalciferol (VITAMIN D3) 2000 units capsule Take 1 tablet by mouth daily   • levothyroxine 100 mcg tablet TAKE 1 TABLET DAILY   • multivitamin (THERAGRAN) TABS Take 1 tablet by mouth daily   • [DISCONTINUED] albuterol (Ventolin HFA) 90 mcg/act inhaler Inhale 2 puffs every 4 (four) hours as needed for wheezing or shortness of breath   • [DISCONTINUED] budesonide (PULMICORT) 90 MCG/ACT inhaler Inhale 1 puff 2 (two) times a day Rinse mouth after use. Objective     /87 (BP Location: Left arm, Patient Position: Sitting, Cuff Size: Standard)   Pulse 69   Temp 97.6 °F (36.4 °C) (Tympanic)   Ht 5' 10.5" (1.791 m)   Wt 117 kg (257 lb 6.4 oz)   SpO2 99%   BMI 36.41 kg/m²     Physical Exam  Vitals and nursing note reviewed. Constitutional:       General: She is not in acute distress. Appearance: She is well-developed. She is not ill-appearing, toxic-appearing or diaphoretic. HENT:      Head: Normocephalic and atraumatic. Mouth/Throat:      Pharynx: Uvula midline. Neck:      Thyroid: No thyroid mass, thyromegaly or thyroid tenderness. Trachea: Trachea and phonation normal.   Cardiovascular:      Rate and Rhythm: Normal rate and regular rhythm. Pulses: Normal pulses. Heart sounds: Normal heart sounds. Pulmonary:      Effort: Pulmonary effort is normal.      Breath sounds: Normal breath sounds and air entry. Abdominal:      Palpations: Abdomen is soft. Tenderness: There is no abdominal tenderness. Musculoskeletal:      Cervical back: Neck supple. Right lower leg: No edema. Left lower leg: No edema. Lymphadenopathy:      Cervical: No cervical adenopathy. Skin:     General: Skin is warm and dry. Coloration: Skin is not pale. Comments: Approx 6mm raised mole right lower leg with variegated color   Neurological:      Mental Status: She is alert and oriented to person, place, and time. Gait: Gait normal.   Psychiatric:         Mood and Affect: Mood normal.         Behavior: Behavior normal. Behavior is cooperative.      [address], DO

## 2023-11-03 ENCOUNTER — OFFICE VISIT (OUTPATIENT)
Dept: FAMILY MEDICINE CLINIC | Facility: CLINIC | Age: 58
End: 2023-11-03
Payer: COMMERCIAL

## 2023-11-03 VITALS
TEMPERATURE: 97.3 F | HEIGHT: 71 IN | DIASTOLIC BLOOD PRESSURE: 96 MMHG | SYSTOLIC BLOOD PRESSURE: 138 MMHG | BODY MASS INDEX: 36.12 KG/M2 | OXYGEN SATURATION: 98 % | WEIGHT: 258 LBS | HEART RATE: 65 BPM

## 2023-11-03 DIAGNOSIS — S20.162A INSECT BITE OF LEFT BREAST, INITIAL ENCOUNTER: Primary | ICD-10-CM

## 2023-11-03 DIAGNOSIS — Z91.018 FOOD ALLERGY: ICD-10-CM
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
## 2023-11-03 PROCEDURE — 99214 OFFICE O/P EST MOD 30 MIN: CPT | Performed by: FAMILY MEDICINE

## 2023-11-03 RX ORDER — AMOXICILLIN 500 MG/1
500 TABLET, FILM COATED ORAL 2 TIMES DAILY
Qty: 14 TABLET | Refills: 0 | Status: SHIPPED | OUTPATIENT
Start: 2023-11-03 | End: 2023-11-10

## 2023-11-03 RX ORDER — FAMOTIDINE 20 MG/1
20 TABLET, FILM COATED ORAL 2 TIMES DAILY
Qty: 30 TABLET | Refills: 1 | Status: SHIPPED | OUTPATIENT
Start: 2023-11-03 | End: 2024-10-28

## 2023-11-03 NOTE — PROGRESS NOTES
Name: Steve Gomez      : 1965      MRN: [id number]  Encounter Provider: Bossman Chavez DO  Encounter Date: 11/3/2023   Encounter department: [address] [place] [address]     1. Insect bite of left breast, initial encounter  -     amoxicillin (AMOXIL) 500 MG tablet; Take 1 tablet (500 mg total) by mouth 2 (two) times a day for 7 days    2. Food allergy  -     famotidine (PEPCID) 20 mg tablet; Take 1 tablet (20 mg total) by mouth 2 (two) times a day  -     Ambulatory Referral to Allergy;  Future           Subjective      Chief Complaint   Patient presents with    Insect Bite     Bug bite with ring around it, just noticed last night, doesn't itch or hurt, bothered her yesterday but not anymore now        Same day sick appt- noticed bug bite with ring around it left upper breast last night  "Just like taking your car to the garage- was even there this morning a light red ring- showed my  and he said 'yeah, that's a ring' but now that I'm looking at it to show you- it's gone- between around 6 o'clock when saw it this morning- the ring is gone- the bite is still there"  "Was annoyingly itchy, now it doesn't itch or anything"  No insect seen, but does work outdoors in her yard frequently  "And while I'm here, I need an allergist- it's getting bad- it's getting worse the things that I'm allergic to" - "food allergies- preservatives and additives"  "Had a cupcake and I shouldn't have - knew that it was store-bought - my throat hurts and my head's starting to itch"      Review of Systems    Current Outpatient Medications on File Prior to Visit   Medication Sig    albuterol (Ventolin HFA) 90 mcg/act inhaler Inhale 2 puffs every 4 (four) hours as needed for wheezing or shortness of breath    Ascorbic Acid (VITAMIN C PO) Take by mouth    Cholecalciferol (VITAMIN D3) 2000 units capsule Take 1 tablet by mouth daily    diphenhydrAMINE HCl (BENADRYL ALLERGY PO) Take by mouth levothyroxine 100 mcg tablet TAKE 1 TABLET DAILY    multivitamin (THERAGRAN) TABS Take 1 tablet by mouth daily       Objective     /96 (BP Location: Left arm, Patient Position: Sitting, Cuff Size: Standard)   Pulse 65   Temp (!) 97.3 °F (36.3 °C) (Tympanic)   Ht 5' 10.5" (1.791 m)   Wt 117 kg (258 lb)   SpO2 98%   BMI 36.50 kg/m²     Physical Exam  Vitals and nursing note reviewed. Constitutional:       General: She is not in acute distress. Appearance: She is well-developed. She is not ill-appearing, toxic-appearing or diaphoretic. HENT:      Head: Normocephalic and atraumatic. Mouth/Throat:      Pharynx: Uvula midline. Neck:      Trachea: Phonation normal.   Cardiovascular:      Rate and Rhythm: Normal rate and regular rhythm. Heart sounds: Normal heart sounds. Pulmonary:      Effort: Pulmonary effort is normal.      Breath sounds: Normal breath sounds and air entry. Skin:     General: Skin is warm and dry. Coloration: Skin is not pale. Neurological:      Mental Status: She is alert and oriented to person, place, and time. Psychiatric:         Behavior: Behavior is cooperative.        Luci Valles,

## 2023-11-22 DIAGNOSIS — E03.9 HYPOTHYROIDISM, UNSPECIFIED TYPE: ICD-10-CM

## 2023-11-22 RX ORDER — LEVOTHYROXINE SODIUM 0.1 MG/1
TABLET ORAL
Qty: 90 TABLET | Refills: 3 | Status: SHIPPED | OUTPATIENT
Start: 2023-11-22

## 2023-12-12 ENCOUNTER — VBI (OUTPATIENT)
Dept: ADMINISTRATIVE | Facility: OTHER | Age: 58
End: 2023-12-12

## 2024-01-03 ENCOUNTER — TELEPHONE (OUTPATIENT)
Dept: FAMILY MEDICINE CLINIC | Facility: CLINIC | Age: 59
End: 2024-01-03

## 2024-01-03 DIAGNOSIS — J45.909 ASTHMA, UNSPECIFIED ASTHMA SEVERITY, UNSPECIFIED WHETHER COMPLICATED, UNSPECIFIED WHETHER PERSISTENT: ICD-10-CM

## 2024-01-03 DIAGNOSIS — L50.9 HIVES: ICD-10-CM

## 2024-01-03 DIAGNOSIS — Z91.018 FOOD ALLERGY: Primary | ICD-10-CM

## 2024-01-03 RX ORDER — ALBUTEROL SULFATE 90 UG/1
2 AEROSOL, METERED RESPIRATORY (INHALATION) EVERY 4 HOURS PRN
Qty: 18 G | Refills: 0 | Status: SHIPPED | OUTPATIENT
Start: 2024-01-03

## 2024-01-03 NOTE — TELEPHONE ENCOUNTER
Re: pt's MyChart message below:    Please let pt know that I am very sorry to hear that she was given this response from one of our specialist associates, and I will pass on her experience to one of our directors.   I placed new order for eval by Allergist, Dr. Milady conwayuary 3, 2024  Romy Blanco   to Me   KL    1/3/24  9:27 AM  Please refer to pts message below. Thanks  Nicki Cleaning   to [facility] (supporting You)         1/3/24  5:48 AM  Hello,  At my last appointment, Dr. Hernandez had referred me to an allergist. I called and spoke to someone regarding my symptoms and helping to decide how to help me best. I answered a series of questions. I told her how at one time, I had to cut my wedding band off, the itching, hives, from eating food with additives and preservatives. She put me on hold to talk to a dr and got back to me. She told me that they could not help me and that  my symptoms were suggestive symptoms and feelings. She said that they could not help me. I was flabbergasted. That sounds like that what is happening is in my head.      Over [place], i went to a party and unfortunately, I ate something that I shouldn't have and had an reaction. I told Benadryl and Zyrtec. I had a hard time breathing and hit my inhaler several times.      Is there another allergist i can call? This is becoming more difficult when I go to conferences or on vacations.      Thank you,   Nicki Cleaning

## 2024-01-22 ENCOUNTER — OFFICE VISIT (OUTPATIENT)
Dept: OBGYN CLINIC | Facility: CLINIC | Age: 59
End: 2024-01-22
Payer: COMMERCIAL

## 2024-01-22 VITALS
WEIGHT: 265.4 LBS | DIASTOLIC BLOOD PRESSURE: 84 MMHG | SYSTOLIC BLOOD PRESSURE: 126 MMHG | HEIGHT: 71 IN | BODY MASS INDEX: 37.15 KG/M2

## 2024-01-22 DIAGNOSIS — Z12.4 SCREENING FOR MALIGNANT NEOPLASM OF THE CERVIX: ICD-10-CM

## 2024-01-22 DIAGNOSIS — R10.2 PELVIC PAIN: ICD-10-CM

## 2024-01-22 DIAGNOSIS — Z01.419 ENCOUNTER FOR GYNECOLOGICAL EXAMINATION WITHOUT ABNORMAL FINDING: Primary | ICD-10-CM

## 2024-01-22 DIAGNOSIS — Z12.31 ENCOUNTER FOR SCREENING MAMMOGRAM FOR BREAST CANCER: ICD-10-CM

## 2024-01-22 PROCEDURE — G0145 SCR C/V CYTO,THINLAYER,RESCR: HCPCS | Performed by: ADVANCED PRACTICE MIDWIFE

## 2024-01-22 PROCEDURE — G0476 HPV COMBO ASSAY CA SCREEN: HCPCS | Performed by: ADVANCED PRACTICE MIDWIFE

## 2024-01-22 PROCEDURE — S0610 ANNUAL GYNECOLOGICAL EXAMINA: HCPCS | Performed by: ADVANCED PRACTICE MIDWIFE

## 2024-01-22 NOTE — PROGRESS NOTES
[facility] [address] Cady Rodriguez PA    ASSESSMENT/PLAN: Nicki Cleaning is a 58 y.o.  who presents for annual gynecologic exam.    Encounter for routine gynecologic examination  - Routine well woman exam completed today.  - Cervical Cancer Screening: Current ASCCP Guidelines reviewed. Last Pap: 2016. Next Pap Due: today  - HPV Vaccination status: Not immunized  - STI screening offered including HIV: not indicated based on hx or requested at time of visit  - Breast Cancer Screening: Last Mammogram 2020, script given  - Colorectal cancer screening was not ordered.  - The following were reviewed in today's visit: breast self exam, mammography screening ordered, adequate intake of calcium and vitamin D, and exercise  - RTO 1 yr    Additional problems addressed at this visit:  1. Encounter for gynecological examination without abnormal finding  -     Liquid-based pap, screening  -     Mammo screening bilateral w 3d & cad; Future; Expected date: 2024  -     HPV High Risk    2. Screening for malignant neoplasm of the cervix  -     Ambulatory Referral to Gynecology  -     Liquid-based pap, screening  -     HPV High Risk    3. Encounter for screening mammogram for breast cancer  -     Mammo screening bilateral w 3d & cad; Future; Expected date: 2024    4. Pelvic pain  -     US pelvis complete w transvaginal; Future; Expected date: 2024          CC:  Annual Gynecologic Examination    HPI: Nicki Cleaning is a 58 y.o.  who presents for annual gynecologic examination.  Nicki presents today for gyn exam. No vaginal bleeding since onset of menopause- period stopped 3 yrs ago. 2016 last pap smear- no, Hx of abnormal pap smear- no. Sexually active- yes, with partner for 38 yrs. Does not desire STI testing.  2020 mammogram- normal, and 2019 colonoscopy- unsure if due- will check. Reports interrupted hrs of sleep daily- has always had a problem with sleep pattern.  "1-2 servings of calcium rich foods daily, takes multivitamin. Exercises: active daily at work. 1-2 servings of caffeine daily. Occasionally SBE. Safe at home- yes. Wears seatbelt - yes Concerns: with intercourse notes pain right side and notes that it has gotten worse. Usually with orgasm, subsides after a minute or 2. She had 2 c-sections and notes that she was told her insides are not normal. Has a bicornuate uterus.         The following portions of the patient's history were reviewed and updated as appropriate: allergies, current medications, past family history, past medical history, obstetric history, gynecologic history, past social history, past surgical history and problem list.    Review of Systems   Constitutional:  Negative for chills, fatigue and fever.   Respiratory:  Negative for cough and shortness of breath.    Cardiovascular:  Negative for chest pain, palpitations and leg swelling.   Gastrointestinal:  Negative for constipation and diarrhea.   Genitourinary:  Positive for dyspareunia and pelvic pain. Negative for difficulty urinating, dysuria, frequency, menstrual problem, urgency, vaginal bleeding, vaginal discharge and vaginal pain.   Neurological:  Negative for light-headedness and headaches.   Psychiatric/Behavioral:  The patient is not nervous/anxious.          Objective:  /84   Ht 5' 10.5\" (1.791 m)   Wt 120 kg (265 lb 6.4 oz)   LMP 01/01/2020   BMI 37.54 kg/m²    Physical Exam  Vitals reviewed.   Constitutional:       Appearance: Normal appearance.   HENT:      Head: Normocephalic.   Neck:      Thyroid: No thyroid mass or thyroid tenderness.   Cardiovascular:      Rate and Rhythm: Normal rate and regular rhythm.      Heart sounds: Normal heart sounds.   Pulmonary:      Effort: Pulmonary effort is normal.      Breath sounds: Normal breath sounds.   Chest:   Breasts:     Right: No mass, nipple discharge, skin change or tenderness.      Left: No mass, nipple discharge, skin change or " tenderness.   Abdominal:      General: There is no distension.      Palpations: There is no mass.      Tenderness: There is no abdominal tenderness. There is no guarding.   Genitourinary:     General: Normal vulva.      Exam position: Lithotomy position.      Labia:         Right: No tenderness or lesion.         Left: No tenderness or lesion.       Vagina: No vaginal discharge, tenderness, bleeding or lesions.      Cervix: No discharge, lesion, erythema or cervical bleeding.      Uterus: Normal. Not enlarged and not tender.       Adnexa:         Right: Tenderness present. No mass or fullness.          Left: No mass, tenderness or fullness.     Musculoskeletal:      Cervical back: Normal range of motion.   Lymphadenopathy:      Upper Body:      Right upper body: No axillary adenopathy.      Left upper body: No axillary adenopathy.   Skin:     General: Skin is warm and dry.   Neurological:      Mental Status: She is alert.   Psychiatric:         Mood and Affect: Mood normal.         Behavior: Behavior normal.         Judgment: Judgment normal.             Olga Garrison CNM  [facility]  01/23/24 10:54 AM

## 2024-01-23 LAB
HPV HR 12 DNA CVX QL NAA+PROBE: NEGATIVE
HPV16 DNA CVX QL NAA+PROBE: NEGATIVE
HPV18 DNA CVX QL NAA+PROBE: NEGATIVE

## 2024-01-25 LAB
LAB AP GYN PRIMARY INTERPRETATION: NORMAL
Lab: NORMAL

## 2024-02-10 ENCOUNTER — HOSPITAL ENCOUNTER (OUTPATIENT)
Dept: ULTRASOUND IMAGING | Facility: HOSPITAL | Age: 59
Discharge: HOME | End: 2024-02-10
Payer: COMMERCIAL

## 2024-02-10 ENCOUNTER — HOSPITAL ENCOUNTER (OUTPATIENT)
Dept: MAMMOGRAPHY | Facility: HOSPITAL | Age: 59
Discharge: HOME | End: 2024-02-10
Payer: COMMERCIAL

## 2024-02-10 DIAGNOSIS — R10.2 PELVIC PAIN: ICD-10-CM

## 2024-02-10 DIAGNOSIS — Z12.31 ENCOUNTER FOR SCREENING MAMMOGRAM FOR BREAST CANCER: ICD-10-CM

## 2024-02-10 DIAGNOSIS — Z01.419 ENCOUNTER FOR GYNECOLOGICAL EXAMINATION WITHOUT ABNORMAL FINDING: ICD-10-CM

## 2024-02-10 PROCEDURE — 76856 US EXAM PELVIC COMPLETE: CPT

## 2024-02-10 PROCEDURE — 77067 SCR MAMMO BI INCL CAD: CPT

## 2024-02-10 PROCEDURE — 76830 TRANSVAGINAL US NON-OB: CPT

## 2024-02-10 PROCEDURE — 77063 BREAST TOMOSYNTHESIS BI: CPT

## 2024-02-13 ENCOUNTER — TELEPHONE (OUTPATIENT)
Dept: OBGYN CLINIC | Facility: CLINIC | Age: 59
End: 2024-02-13

## 2024-02-22 ENCOUNTER — TELEPHONE (OUTPATIENT)
Dept: OBGYN CLINIC | Facility: MEDICAL CENTER | Age: 59
End: 2024-02-22

## 2024-02-22 DIAGNOSIS — N94.9 ADNEXAL CYST: ICD-10-CM

## 2024-02-22 DIAGNOSIS — D25.9 UTERINE LEIOMYOMA, UNSPECIFIED LOCATION: Primary | ICD-10-CM

## 2024-02-22 NOTE — TELEPHONE ENCOUNTER
----- Message from Olga Garrison CNM sent at 2/21/2024  3:10 PM EST -----  Please contact Nicki regarding results.

## 2024-04-17 ENCOUNTER — RA CDI HCC (OUTPATIENT)
Dept: OTHER | Facility: HOSPITAL | Age: 59
End: 2024-04-17

## 2024-04-17 PROBLEM — Z86.16 PERSONAL HISTORY OF COVID-19: Status: RESOLVED | Noted: 2022-03-02 | Resolved: 2024-04-17
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
## 2024-04-24 ENCOUNTER — OFFICE VISIT (OUTPATIENT)
Dept: FAMILY MEDICINE CLINIC | Facility: CLINIC | Age: 59
End: 2024-04-24
Payer: COMMERCIAL

## 2024-04-24 VITALS
TEMPERATURE: 97 F | HEART RATE: 87 BPM | BODY MASS INDEX: 36.82 KG/M2 | SYSTOLIC BLOOD PRESSURE: 141 MMHG | OXYGEN SATURATION: 95 % | HEIGHT: 71 IN | WEIGHT: 263 LBS | DIASTOLIC BLOOD PRESSURE: 81 MMHG

## 2024-04-24 DIAGNOSIS — Z00.00 ANNUAL PHYSICAL EXAM: Primary | ICD-10-CM

## 2024-04-24 DIAGNOSIS — Z13.1 ENCOUNTER FOR SCREENING EXAMINATION FOR IMPAIRED GLUCOSE REGULATION AND DIABETES MELLITUS: ICD-10-CM

## 2024-04-24 DIAGNOSIS — E78.5 HYPERLIPIDEMIA, UNSPECIFIED HYPERLIPIDEMIA TYPE: ICD-10-CM

## 2024-04-24 DIAGNOSIS — E03.9 HYPOTHYROIDISM, UNSPECIFIED TYPE: ICD-10-CM

## 2024-04-24 DIAGNOSIS — J45.20 MILD INTERMITTENT ASTHMA WITHOUT COMPLICATION: ICD-10-CM

## 2024-04-24 DIAGNOSIS — Z11.59 NEED FOR HEPATITIS C SCREENING TEST: ICD-10-CM

## 2024-04-24 DIAGNOSIS — Z13.0 SCREENING FOR DEFICIENCY ANEMIA: ICD-10-CM

## 2024-04-24 PROBLEM — R07.89 BURNING CHEST PAIN: Status: RESOLVED | Noted: 2022-03-02 | Resolved: 2024-04-24

## 2024-04-24 PROBLEM — E55.9 VITAMIN D DEFICIENCY: Status: RESOLVED | Noted: 2017-11-02 | Resolved: 2024-04-24

## 2024-04-24 PROCEDURE — 99396 PREV VISIT EST AGE 40-64: CPT | Performed by: FAMILY MEDICINE

## 2024-04-24 PROCEDURE — 99214 OFFICE O/P EST MOD 30 MIN: CPT | Performed by: FAMILY MEDICINE

## 2024-07-18 ENCOUNTER — APPOINTMENT (OUTPATIENT)
Dept: RADIOLOGY | Facility: CLINIC | Age: 59
End: 2024-07-18
Payer: COMMERCIAL

## 2024-07-18 ENCOUNTER — OFFICE VISIT (OUTPATIENT)
Dept: FAMILY MEDICINE CLINIC | Facility: CLINIC | Age: 59
End: 2024-07-18
Payer: COMMERCIAL

## 2024-07-18 VITALS
WEIGHT: 268 LBS | HEART RATE: 85 BPM | SYSTOLIC BLOOD PRESSURE: 118 MMHG | HEIGHT: 71 IN | OXYGEN SATURATION: 98 % | BODY MASS INDEX: 37.52 KG/M2 | TEMPERATURE: 97.4 F | DIASTOLIC BLOOD PRESSURE: 88 MMHG

## 2024-07-18 DIAGNOSIS — R91.8 LUNG FIELD ABNORMAL FINDING ON EXAMINATION: ICD-10-CM

## 2024-07-18 DIAGNOSIS — R05.8 COUGH PRODUCTIVE OF PURULENT SPUTUM: ICD-10-CM

## 2024-07-18 DIAGNOSIS — J45.21 MILD INTERMITTENT ASTHMA WITH EXACERBATION: Primary | ICD-10-CM

## 2024-07-18 DIAGNOSIS — J45.21 MILD INTERMITTENT ASTHMA WITH EXACERBATION: ICD-10-CM

## 2024-07-18 PROCEDURE — 99214 OFFICE O/P EST MOD 30 MIN: CPT | Performed by: FAMILY MEDICINE

## 2024-07-18 PROCEDURE — 71046 X-RAY EXAM CHEST 2 VIEWS: CPT

## 2024-07-18 RX ORDER — ALBUTEROL SULFATE 90 UG/1
2 AEROSOL, METERED RESPIRATORY (INHALATION) EVERY 4 HOURS PRN
Qty: 18 G | Refills: 0 | Status: SHIPPED | OUTPATIENT
Start: 2024-07-18

## 2024-07-18 RX ORDER — AZITHROMYCIN 250 MG/1
TABLET, FILM COATED ORAL
Qty: 6 TABLET | Refills: 0 | Status: SHIPPED | OUTPATIENT
Start: 2024-07-18 | End: 2024-07-23

## 2024-07-18 NOTE — PROGRESS NOTES
"Assessment/Plan:      Diagnoses and all orders for this visit:    Mild intermittent asthma with exacerbation  -     albuterol (Ventolin HFA) 90 mcg/act inhaler; Inhale 2 puffs every 4 (four) hours as needed for wheezing or shortness of breath  -     XR chest pa & lateral; Future  -     azithromycin (Zithromax) 250 mg tablet; Take 2 tablets (500 mg total) by mouth daily for 1 day, THEN 1 tablet (250 mg total) daily for 4 days.    Cough productive of purulent sputum  -     XR chest pa & lateral; Future  -     azithromycin (Zithromax) 250 mg tablet; Take 2 tablets (500 mg total) by mouth daily for 1 day, THEN 1 tablet (250 mg total) daily for 4 days.    Lung field abnormal finding on examination  -     XR chest pa & lateral; Future  -     azithromycin (Zithromax) 250 mg tablet; Take 2 tablets (500 mg total) by mouth daily for 1 day, THEN 1 tablet (250 mg total) daily for 4 days.          Subjective:  Chief Complaint   Patient presents with   • Chest Pain     Started Tuesday night and has chest burning- Cough- Congestion- Fever and chills   OTC Dayquil/Nightquil        Patient ID: Nicki Cleaning is a 58 y.o. female.    Next day sick appt \"Chest is burning / tired / head is congested / sore throat since yday getting worse\" per scheduling notes   \"Starting to feel a little bit better since called yesterday for the appointment\"  Did travel to her daughter's house in [place] recently, 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      Review of Systems      Objective:     Physical Exam  Vitals and nursing note reviewed.   Constitutional:       General: She is not in acute distress.     Appearance: She is well-groomed. She is not ill-appearing, toxic-appearing or diaphoretic.   HENT:      Head: Normocephalic and atraumatic.      Mouth/Throat:      Pharynx: Uvula midline.   Neck:      Thyroid: No thyroid mass, thyromegaly or thyroid tenderness.      Vascular: No JVD.      Trachea: Trachea and phonation normal.   Cardiovascular:      Rate and " Rhythm: Normal rate and regular rhythm.      Pulses: Normal pulses.      Heart sounds: Normal heart sounds.   Pulmonary:      Effort: Pulmonary effort is normal.      Breath sounds: Normal air entry. Wheezing present. No decreased breath sounds, rhonchi or rales.   Musculoskeletal:      Cervical back: Neck supple.      Right lower leg: No edema.      Left lower leg: No edema.   Lymphadenopathy:      Cervical: No cervical adenopathy.   Skin:     General: Skin is warm and dry.      Capillary Refill: Capillary refill takes less than 2 seconds.      Coloration: Skin is not pale.   Neurological:      Mental Status: She is alert and oriented to person, place, and time.   Psychiatric:         Behavior: Behavior is cooperative.

## 2024-08-16 ENCOUNTER — HOSPITAL ENCOUNTER (OUTPATIENT)
Dept: ULTRASOUND IMAGING | Facility: HOSPITAL | Age: 59
End: 2024-08-16
Payer: COMMERCIAL

## 2024-08-16 DIAGNOSIS — N94.9 ADNEXAL CYST: ICD-10-CM

## 2024-08-16 DIAGNOSIS — D25.9 UTERINE LEIOMYOMA, UNSPECIFIED LOCATION: ICD-10-CM

## 2024-08-16 PROCEDURE — 76830 TRANSVAGINAL US NON-OB: CPT

## 2024-08-16 PROCEDURE — 76856 US EXAM PELVIC COMPLETE: CPT

## 2024-10-29 ENCOUNTER — RA CDI HCC (OUTPATIENT)
Dept: OTHER | Facility: HOSPITAL | Age: 59
End: 2024-10-29

## 2024-11-05 ENCOUNTER — OFFICE VISIT (OUTPATIENT)
Dept: FAMILY MEDICINE CLINIC | Facility: CLINIC | Age: 59
End: 2024-11-05
Payer: COMMERCIAL

## 2024-11-05 VITALS
DIASTOLIC BLOOD PRESSURE: 60 MMHG | TEMPERATURE: 97.5 F | BODY MASS INDEX: 36.12 KG/M2 | OXYGEN SATURATION: 97 % | HEART RATE: 83 BPM | WEIGHT: 258 LBS | SYSTOLIC BLOOD PRESSURE: 138 MMHG | HEIGHT: 71 IN

## 2024-11-05 DIAGNOSIS — Z78.0 POSTMENOPAUSAL: ICD-10-CM

## 2024-11-05 DIAGNOSIS — Z91.018 FOOD ALLERGY: ICD-10-CM

## 2024-11-05 DIAGNOSIS — J45.20 MILD INTERMITTENT ASTHMA WITHOUT COMPLICATION: ICD-10-CM

## 2024-11-05 DIAGNOSIS — E78.5 HYPERLIPIDEMIA, UNSPECIFIED HYPERLIPIDEMIA TYPE: ICD-10-CM

## 2024-11-05 DIAGNOSIS — E03.9 HYPOTHYROIDISM, UNSPECIFIED TYPE: Primary | ICD-10-CM

## 2024-11-05 PROCEDURE — 99214 OFFICE O/P EST MOD 30 MIN: CPT | Performed by: FAMILY MEDICINE

## 2024-11-05 RX ORDER — LEVOTHYROXINE SODIUM 100 UG/1
100 TABLET ORAL DAILY
Qty: 90 TABLET | Refills: 0 | Status: SHIPPED | OUTPATIENT
Start: 2024-11-05

## 2024-11-05 RX ORDER — ALBUTEROL SULFATE 90 UG/1
2 INHALANT RESPIRATORY (INHALATION) EVERY 4 HOURS PRN
Qty: 18 G | Refills: 1 | Status: SHIPPED | OUTPATIENT
Start: 2024-11-05

## 2024-11-05 RX ORDER — FAMOTIDINE 20 MG/1
20 TABLET, FILM COATED ORAL 2 TIMES DAILY
Qty: 90 TABLET | Refills: 1 | Status: SHIPPED | OUTPATIENT
Start: 2024-11-05 | End: 2025-10-31
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
## 2025-01-27 ENCOUNTER — ANNUAL EXAM (OUTPATIENT)
Dept: OBGYN CLINIC | Facility: CLINIC | Age: 60
End: 2025-01-27
Payer: COMMERCIAL

## 2025-01-27 VITALS
BODY MASS INDEX: 36.68 KG/M2 | HEIGHT: 71 IN | WEIGHT: 262 LBS | DIASTOLIC BLOOD PRESSURE: 82 MMHG | SYSTOLIC BLOOD PRESSURE: 126 MMHG

## 2025-01-27 DIAGNOSIS — Z01.419 ENCOUNTER FOR GYNECOLOGICAL EXAMINATION WITHOUT ABNORMAL FINDING: Primary | ICD-10-CM

## 2025-01-27 DIAGNOSIS — D25.9 UTERINE LEIOMYOMA, UNSPECIFIED LOCATION: ICD-10-CM

## 2025-01-27 DIAGNOSIS — Z12.31 ENCOUNTER FOR SCREENING MAMMOGRAM FOR BREAST CANCER: ICD-10-CM

## 2025-01-27 PROCEDURE — S0612 ANNUAL GYNECOLOGICAL EXAMINA: HCPCS | Performed by: ADVANCED PRACTICE MIDWIFE

## 2025-01-27 NOTE — PROGRESS NOTES
Name: Nicki Cleaning      : 1965      MRN: [id number]  Encounter Provider: Olga Garrison CNM  Encounter Date: 2025   Encounter department: [facility] [place]  :  Assessment & Plan  Encounter for gynecological examination without abnormal finding  - Routine well woman exam completed today.  - Cervical Cancer Screening: Current ASCCP Guidelines reviewed. Last Pap: 2024 normal. Next Pap Due:   - HPV Vaccination status: Not immunized  - STI screening offered including HIV: not indicated based on hx or requested at time of visit  - Breast Cancer Screening: Last Mammogram 02/10/2024, script in chart  - Colorectal cancer screening - Patient aware of need to schedule- will call provider.  - The following were reviewed in today's visit: breast self exam, mammography screening ordered, menopause, adequate intake of calcium and vitamin D, and exercise   -Return to office 1 year      Orders:  •  Mammo screening bilateral w 3d and cad; Future    Encounter for screening mammogram for breast cancer    Orders:  •  Mammo screening bilateral w 3d and cad; Future    Uterine leiomyoma, unspecified location  Stable ultrasound            History of Present Illness   Nicki presents today for gyn exam. NO vaginal bleeding since onset of menopause.  last pap smear- normal, Hx of abnormal pap smear - no. Sexually active- yes, with partner for 40 yrs. Does not desire STI testing.  2024 mammogram- normal, and 2019 colonoscopy- repeat - needs to schedule. Reports 5-6 interrupted hrs of sleep daily, 1-2 servings of calcium rich foods daily. Exercises: no routine per week. 2 servings of caffeine daily. Breast changes- none. Safe at home- yes. Concerns: none. Continues to have  pain with intercourse, previous ultrasound with minimal change of uterine fibroids.  Reviewed possible causes and reviewed option of be eval by physical therapy, will call if desires.    Nicki Cleaning is a 59 y.o.  female who presents annual gyn exam  History obtained from: patient    Review of Systems   Constitutional:  Negative for chills, fatigue, fever and unexpected weight change.   Respiratory:  Negative for cough and shortness of breath.    Cardiovascular:  Negative for chest pain, palpitations and leg swelling.   Gastrointestinal:  Negative for abdominal pain, constipation and diarrhea.   Genitourinary:  Positive for dyspareunia. Negative for difficulty urinating, dysuria, frequency, pelvic pain, urgency, vaginal bleeding, vaginal discharge and vaginal pain.        Pain with orgasm- will consider PT   Musculoskeletal:  Negative for back pain and gait problem.   Neurological:  Negative for dizziness and headaches.   Psychiatric/Behavioral:  Negative for self-injury. The patient is not nervous/anxious.      Medical History Reviewed by provider this encounter:  Tobacco  Allergies  Meds  Problems  Med Hx  Surg Hx  Fam Hx     .  Current Outpatient Medications on File Prior to Visit   Medication Sig Dispense Refill   • albuterol (Ventolin HFA) 90 mcg/act inhaler Inhale 2 puffs every 4 (four) hours as needed for wheezing or shortness of breath 18 g 1   • Ascorbic Acid (VITAMIN C PO) Take by mouth     • Cholecalciferol (VITAMIN D3) 2000 units capsule Take 1 tablet by mouth daily     • diphenhydrAMINE HCl (BENADRYL ALLERGY PO) Take by mouth     • famotidine (PEPCID) 20 mg tablet Take 1 tablet (20 mg total) by mouth 2 (two) times a day 90 tablet 1   • levothyroxine 100 mcg tablet Take 1 tablet (100 mcg total) by mouth daily 90 tablet 0   • multivitamin (THERAGRAN) TABS Take 1 tablet by mouth daily     • TURMERIC PO Take by mouth       No current facility-administered medications on file prior to visit.      Social History     Tobacco Use   • Smoking status: Never   • Smokeless tobacco: Never   Vaping Use   • Vaping status: Never Used   Substance and Sexual Activity   • Alcohol use: No   • Drug use: No   • Sexual activity:  "Yes     Partners: Male     Birth control/protection: Post-menopausal        Objective   /82   Ht 5' 10.5\" (1.791 m)   Wt 119 kg (262 lb)   LMP 01/01/2020   BMI 37.06 kg/m²      Physical Exam  Vitals reviewed.   Constitutional:       Appearance: Normal appearance.   Neck:      Thyroid: No thyroid mass or thyroid tenderness.   Cardiovascular:      Rate and Rhythm: Normal rate.   Pulmonary:      Effort: Pulmonary effort is normal.   Chest:   Breasts:     Right: No mass, nipple discharge, skin change or tenderness.      Left: No mass, nipple discharge, skin change or tenderness.   Abdominal:      Tenderness: There is no abdominal tenderness. There is no guarding or rebound.   Genitourinary:     General: Normal vulva.      Exam position: Lithotomy position.      Labia:         Right: No rash, tenderness or lesion.         Left: No rash, tenderness or lesion.       Urethra: No urethral pain, urethral swelling or urethral lesion.      Vagina: No vaginal discharge, erythema, tenderness, bleeding or lesions.      Cervix: No cervical motion tenderness, discharge, friability, lesion or erythema.      Uterus: Normal. Not enlarged and not tender.       Adnexa:         Right: No mass, tenderness or fullness.          Left: No mass, tenderness or fullness.     Lymphadenopathy:      Upper Body:      Right upper body: No axillary adenopathy.      Left upper body: No axillary adenopathy.   Neurological:      Mental Status: She is alert and oriented to person, place, and time.   Psychiatric:         Mood and Affect: Mood normal.         Behavior: Behavior normal.         "

## 2025-04-17 ENCOUNTER — APPOINTMENT (OUTPATIENT)
Dept: LAB | Facility: CLINIC | Age: 60
End: 2025-04-17
Attending: FAMILY MEDICINE
Payer: COMMERCIAL

## 2025-04-17 DIAGNOSIS — E78.5 HYPERLIPIDEMIA, UNSPECIFIED HYPERLIPIDEMIA TYPE: ICD-10-CM

## 2025-04-17 DIAGNOSIS — E03.9 HYPOTHYROIDISM, UNSPECIFIED TYPE: ICD-10-CM

## 2025-04-17 DIAGNOSIS — Z13.1 ENCOUNTER FOR SCREENING EXAMINATION FOR IMPAIRED GLUCOSE REGULATION AND DIABETES MELLITUS: ICD-10-CM

## 2025-04-17 DIAGNOSIS — Z13.0 SCREENING FOR DEFICIENCY ANEMIA: ICD-10-CM

## 2025-04-17 DIAGNOSIS — Z11.59 NEED FOR HEPATITIS C SCREENING TEST: ICD-10-CM

## 2025-04-17 LAB
ALBUMIN SERPL BCG-MCNC: 4.2 G/DL (ref 3.5–5)
ALP SERPL-CCNC: 55 U/L (ref 34–104)
ALT SERPL W P-5'-P-CCNC: 28 U/L (ref 7–52)
ANION GAP SERPL CALCULATED.3IONS-SCNC: 9 MMOL/L (ref 4–13)
AST SERPL W P-5'-P-CCNC: 23 U/L (ref 13–39)
BASOPHILS # BLD AUTO: 0.04 THOUSANDS/ÂΜL (ref 0–0.1)
BASOPHILS NFR BLD AUTO: 1 % (ref 0–1)
BILIRUB SERPL-MCNC: 0.63 MG/DL (ref 0.2–1)
BUN SERPL-MCNC: 17 MG/DL (ref 5–25)
CALCIUM SERPL-MCNC: 9.4 MG/DL (ref 8.4–10.2)
CHLORIDE SERPL-SCNC: 103 MMOL/L (ref 96–108)
CHOLEST SERPL-MCNC: 218 MG/DL (ref ?–200)
CO2 SERPL-SCNC: 28 MMOL/L (ref 21–32)
CREAT SERPL-MCNC: 0.7 MG/DL (ref 0.6–1.3)
EOSINOPHIL # BLD AUTO: 0.29 THOUSAND/ÂΜL (ref 0–0.61)
EOSINOPHIL NFR BLD AUTO: 5 % (ref 0–6)
ERYTHROCYTE [DISTWIDTH] IN BLOOD BY AUTOMATED COUNT: 11.9 % (ref 11.6–15.1)
GFR SERPL CREATININE-BSD FRML MDRD: 95 ML/MIN/1.73SQ M
GLUCOSE P FAST SERPL-MCNC: 91 MG/DL (ref 65–99)
HCT VFR BLD AUTO: 45.3 % (ref 34.8–46.1)
HCV AB SER QL: NORMAL
HDLC SERPL-MCNC: 54 MG/DL
HGB BLD-MCNC: 14.8 G/DL (ref 11.5–15.4)
IMM GRANULOCYTES # BLD AUTO: 0.02 THOUSAND/UL (ref 0–0.2)
IMM GRANULOCYTES NFR BLD AUTO: 0 % (ref 0–2)
LDLC SERPL CALC-MCNC: 149 MG/DL (ref 0–100)
LYMPHOCYTES # BLD AUTO: 1.52 THOUSANDS/ÂΜL (ref 0.6–4.47)
LYMPHOCYTES NFR BLD AUTO: 27 % (ref 14–44)
MCH RBC QN AUTO: 30.3 PG (ref 26.8–34.3)
MCHC RBC AUTO-ENTMCNC: 32.7 G/DL (ref 31.4–37.4)
MCV RBC AUTO: 93 FL (ref 82–98)
MONOCYTES # BLD AUTO: 0.37 THOUSAND/ÂΜL (ref 0.17–1.22)
MONOCYTES NFR BLD AUTO: 7 % (ref 4–12)
NEUTROPHILS # BLD AUTO: 3.39 THOUSANDS/ÂΜL (ref 1.85–7.62)
NEUTS SEG NFR BLD AUTO: 60 % (ref 43–75)
NONHDLC SERPL-MCNC: 164 MG/DL
NRBC BLD AUTO-RTO: 0 /100 WBCS
PLATELET # BLD AUTO: 256 THOUSANDS/UL (ref 149–390)
PMV BLD AUTO: 10.5 FL (ref 8.9–12.7)
POTASSIUM SERPL-SCNC: 4.3 MMOL/L (ref 3.5–5.3)
PROT SERPL-MCNC: 6.8 G/DL (ref 6.4–8.4)
RBC # BLD AUTO: 4.89 MILLION/UL (ref 3.81–5.12)
SODIUM SERPL-SCNC: 140 MMOL/L (ref 135–147)
T4 SERPL-MCNC: 11.24 UG/DL (ref 6.09–12.23)
TRIGL SERPL-MCNC: 75 MG/DL (ref ?–150)
TSH SERPL DL<=0.05 MIU/L-ACNC: 3.36 UIU/ML (ref 0.45–4.5)
WBC # BLD AUTO: 5.63 THOUSAND/UL (ref 4.31–10.16)

## 2025-04-17 PROCEDURE — 84443 ASSAY THYROID STIM HORMONE: CPT

## 2025-04-17 PROCEDURE — 80061 LIPID PANEL: CPT

## 2025-04-17 PROCEDURE — 86803 HEPATITIS C AB TEST: CPT

## 2025-04-17 PROCEDURE — 36415 COLL VENOUS BLD VENIPUNCTURE: CPT

## 2025-04-17 PROCEDURE — 80053 COMPREHEN METABOLIC PANEL: CPT

## 2025-04-17 PROCEDURE — 85025 COMPLETE CBC W/AUTO DIFF WBC: CPT

## 2025-04-17 PROCEDURE — 84436 ASSAY OF TOTAL THYROXINE: CPT

## 2025-04-30 ENCOUNTER — OFFICE VISIT (OUTPATIENT)
Dept: FAMILY MEDICINE CLINIC | Facility: CLINIC | Age: 60
End: 2025-04-30
Payer: COMMERCIAL

## 2025-04-30 VITALS
WEIGHT: 263.2 LBS | BODY MASS INDEX: 36.85 KG/M2 | SYSTOLIC BLOOD PRESSURE: 131 MMHG | DIASTOLIC BLOOD PRESSURE: 85 MMHG | HEIGHT: 71 IN | TEMPERATURE: 98.2 F | HEART RATE: 78 BPM | OXYGEN SATURATION: 98 %

## 2025-04-30 DIAGNOSIS — D36.7 DERMOID CYST OF LEFT LOWER EXTREMITY: ICD-10-CM

## 2025-04-30 DIAGNOSIS — E03.9 HYPOTHYROIDISM, UNSPECIFIED TYPE: ICD-10-CM

## 2025-04-30 DIAGNOSIS — J30.1 SEASONAL ALLERGIC RHINITIS DUE TO POLLEN: ICD-10-CM

## 2025-04-30 DIAGNOSIS — Z00.00 ANNUAL PHYSICAL EXAM: Primary | ICD-10-CM

## 2025-04-30 DIAGNOSIS — J45.20 MILD INTERMITTENT ASTHMA WITHOUT COMPLICATION: ICD-10-CM

## 2025-04-30 DIAGNOSIS — E78.5 HYPERLIPIDEMIA, UNSPECIFIED HYPERLIPIDEMIA TYPE: ICD-10-CM

## 2025-04-30 PROBLEM — J45.21 MILD INTERMITTENT ASTHMA WITH EXACERBATION: Status: RESOLVED | Noted: 2024-07-18 | Resolved: 2025-04-30

## 2025-04-30 PROCEDURE — 99214 OFFICE O/P EST MOD 30 MIN: CPT | Performed by: FAMILY MEDICINE

## 2025-04-30 PROCEDURE — 99396 PREV VISIT EST AGE 40-64: CPT | Performed by: FAMILY MEDICINE

## 2025-04-30 RX ORDER — LEVOTHYROXINE SODIUM 100 UG/1
100 TABLET ORAL DAILY
Qty: 90 TABLET | Refills: 1 | Status: SHIPPED | OUTPATIENT
Start: 2025-04-30

## 2025-04-30 NOTE — ASSESSMENT & PLAN NOTE
"States her allergy sx have been much better since she started taking turmeric \"for inflammation\"       "

## 2025-04-30 NOTE — ASSESSMENT & PLAN NOTE
Small cyst upper inner left thigh- not bothering pt at this time and not increasing in size- monitor

## 2025-04-30 NOTE — PATIENT INSTRUCTIONS
"Patient Education     Routine physical for adults   The Basics   Written by the doctors and editors at [facility]   What is a physical? -- A physical is a routine visit, or \"check-up,\" with your doctor. You might also hear it called a \"wellness visit\" or \"preventive visit.\"  During each visit, the doctor will:   Ask about your physical and mental health   Ask about your habits, behaviors, and lifestyle   Do an exam   Give you vaccines if needed   Talk to you about any medicines you take   Give advice about your health   Answer your questions  Getting regular check-ups is an important part of taking care of your health. It can help your doctor find and treat any problems you have. But it's also important for preventing health problems.  A routine physical is different from a \"sick visit.\" A sick visit is when you see a doctor because of a health concern or problem. Since physicals are scheduled ahead of time, you can think about what you want to ask the doctor.  How often should I get a physical? -- It depends on your age and health. In general, for people age 21 years and older:   If you are younger than 50 years, you might be able to get a physical every 3 years.   If you are 50 years or older, your doctor might recommend a physical every year.  If you have an ongoing health condition, like diabetes or high blood pressure, your doctor will probably want to see you more often.  What happens during a physical? -- In general, each visit will include:   Physical exam - The doctor or nurse will check your height, weight, heart rate, and blood pressure. They will also look at your eyes and ears. They will ask about how you are feeling and whether you have any symptoms that bother you.   Medicines - It's a good idea to bring a list of all the medicines you take to each doctor visit. Your doctor will talk to you about your medicines and answer any questions. Tell them if you are having any side effects that bother you. You " "should also tell them if you are having trouble paying for any of your medicines.   Habits and behaviors - This includes:   Your diet   Your exercise habits   Whether you smoke, drink alcohol, or use drugs   Whether you are sexually active   Whether you feel safe at home  Your doctor will talk to you about things you can do to improve your health and lower your risk of health problems. They will also offer help and support. For example, if you want to quit smoking, they can give you advice and might prescribe medicines. If you want to improve your diet or get more physical activity, they can help you with this, too.   Lab tests, if needed - The tests you get will depend on your age and situation. For example, your doctor might want to check your:   Cholesterol   Blood sugar   Iron level   Vaccines - The recommended vaccines will depend on your age, health, and what vaccines you already had. Vaccines are very important because they can prevent certain serious or deadly infections.   Discussion of screening - \"Screening\" means checking for diseases or other health problems before they cause symptoms. Your doctor can recommend screening based on your age, risk, and preferences. This might include tests to check for:   Cancer, such as breast, prostate, cervical, ovarian, colorectal, prostate, lung, or skin cancer   Sexually transmitted infections, such as chlamydia and gonorrhea   Mental health conditions like depression and anxiety  Your doctor will talk to you about the different types of screening tests. They can help you decide which screenings to have. They can also explain what the results might mean.   Answering questions - The physical is a good time to ask the doctor or nurse questions about your health. If needed, they can refer you to other doctors or specialists, too.  Adults older than 65 years often need other care, too. As you get older, your doctor will talk to you about:   How to prevent falling at " home   Hearing or vision tests   Memory testing   How to take your medicines safely   Making sure that you have the help and support you need at home  All topics are updated as new evidence becomes available and our peer review process is complete.  This topic retrieved from [employer] on: May 02, 2024.  Topic 954128 Version 1.0  Release: 32.4.3 - C32.122  © 2024 UpToDate, Inc. and/or its affiliates. All rights reserved.  Consumer Information Use and Disclaimer   Disclaimer: This generalized information is a limited summary of diagnosis, treatment, and/or medication information. It is not meant to be comprehensive and should be used as a tool to help the user understand and/or assess potential diagnostic and treatment options. It does NOT include all information about conditions, treatments, medications, side effects, or risks that may apply to a specific patient. It is not intended to be medical advice or a substitute for the medical advice, diagnosis, or treatment of a health care provider based on the health care provider's examination and assessment of a patient's specific and unique circumstances. Patients must speak with a health care provider for complete information about their health, medical questions, and treatment options, including any risks or benefits regarding use of medications. This information does not endorse any treatments or medications as safe, effective, or approved for treating a specific patient. UpToDate, Inc. and its affiliates disclaim any warranty or liability relating to this information or the use thereof.The use of this information is governed by the Terms of Use, available at https://www.woltersCloSysuwer.com/en/know/clinical-effectiveness-terms. 2024© UpToDate, Inc. and its affiliates and/or licensors. All rights reserved.  Copyright   © 2024 UpToDate, Inc. and/or its affiliates. All rights reserved.

## 2025-04-30 NOTE — PROGRESS NOTES
"Adult Annual Physical  Name: Nicki Cleaning      : 1965      MRN: [id number]  Encounter Provider: Shanda Hernandez DO  Encounter Date: 2025   Encounter department: [facility]    :  Assessment & Plan  Annual physical exam         Mild intermittent asthma without complication  Stable, cpm       Hyperlipidemia, unspecified hyperlipidemia type  Stable, cpm  Orders:  •  Lipid Panel with Direct LDL reflex; Future    Hypothyroidism, unspecified type    Orders:  •  levothyroxine 100 mcg tablet; Take 1 tablet (100 mcg total) by mouth daily    Seasonal allergic rhinitis due to pollen  States her allergy sx have been much better since she started taking turmeric \"for inflammation\"       Dermoid cyst of left lower extremity  Small cyst upper inner left thigh- not bothering pt at this time and not increasing in size- monitor           Preventive Screenings:  - Diabetes Screening: screening up-to-date  - Cholesterol Screening: screening not indicated and has hyperlipidemia   - Hepatitis C screening: screening up-to-date   - Cervical cancer screening: screening up-to-date   - Breast cancer screening: screening up-to-date   - Colon cancer screening: screening up-to-date   - Lung cancer screening: screening not indicated     Immunizations:  - Immunizations due: Prevnar 20 and Zoster (Shingrix)       Chief Complaint   Patient presents with   • Annual Exam          • Mass     Pt reports a lump on her groin area that started about a month ago. Pt denies any pain, swelling and redness on the site.         History of Present Illness     Adult Annual Physical:  Patient presents for annual physical. Plus problem-focused f/u and also new c/o groin lump, not increasing or decreasing in size  States moved her mom in to live with them, so under a bit more stress.     Diet and Physical Activity:  - Diet/Nutrition: well balanced diet. 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 pt states she use the treadmill    General Health:  - Sleep: sleeps poorly.  - Hearing: normal hearing bilateral ears.  - Vision: no vision problems.  - Dental: regular dental visits, brushes teeth three times daily and floss regularly.    /GYN Health:  - Follows with GYN: yes.   - Menopause: postmenopausal.   - History of STDs: no    Advanced Care Planning:  - Has an advanced directive?: no    - Has a durable medical POA?: no    - ACP document given to patient?: yes      Review of Systems   Constitutional: Negative.    Respiratory: Negative.     Cardiovascular: Negative.    Gastrointestinal: Negative.    Neurological: Negative.    Hematological: Negative.          Objective   /85   Pulse 78   Temp 98.2 °F (36.8 °C) (Tympanic)   Ht 5' 10.5\" (1.791 m)   Wt 119 kg (263 lb 3.2 oz)   LMP 01/01/2020   SpO2 98%   BMI 37.23 kg/m²     Physical Exam  Vitals and nursing note reviewed.   Constitutional:       General: She is not in acute distress.     Appearance: She is well-groomed. She is not ill-appearing, toxic-appearing or diaphoretic.      Comments: Extremely pleasant as always   HENT:      Head: Normocephalic and atraumatic.      Right Ear: Tympanic membrane, ear canal and external ear normal.      Left Ear: Tympanic membrane, ear canal and external ear normal.      Nose: Nose normal.      Mouth/Throat:      Lips: Pink.      Mouth: Mucous membranes are moist.      Pharynx: Oropharynx is clear. Uvula midline.      Tonsils: 0 on the right. 0 on the left.   Eyes:      General: Lids are normal.      Extraocular Movements: Extraocular movements intact.      Conjunctiva/sclera: Conjunctivae normal.      Pupils: Pupils are equal, round, and reactive to light.   Neck:      Thyroid: No thyroid mass, thyromegaly or thyroid tenderness.      Vascular: No JVD.      Trachea: Trachea and phonation normal.   Cardiovascular:      Rate and Rhythm: Normal rate and regular " rhythm.      Pulses: Normal pulses.      Heart sounds: Normal heart sounds.   Pulmonary:      Effort: Pulmonary effort is normal.      Breath sounds: Normal breath sounds and air entry.   Abdominal:      General: Bowel sounds are normal. There is no distension or abdominal bruit.      Palpations: Abdomen is soft. There is no hepatomegaly, splenomegaly or mass.      Tenderness: There is no abdominal tenderness.      Hernia: There is no hernia in the ventral area.   Musculoskeletal:      Cervical back: Neck supple.      Right lower leg: No edema.      Left lower leg: No edema.   Lymphadenopathy:      Cervical: No cervical adenopathy.   Skin:     General: Skin is warm and dry.      Capillary Refill: Capillary refill takes less than 2 seconds.      Coloration: Skin is not pale.   Neurological:      General: No focal deficit present.      Mental Status: She is alert and oriented to person, place, and time.      Cranial Nerves: Cranial nerves 2-12 are intact.      Sensory: Sensation is intact.      Motor: Motor function is intact.      Gait: Gait normal.      Deep Tendon Reflexes: Reflexes are normal and symmetric.   Psychiatric:         Mood and Affect: Mood normal.         Behavior: Behavior normal. Behavior is cooperative.         Cognition and Memory: Cognition and memory normal.         Judgment: Judgment normal.

## 2025-05-23 DIAGNOSIS — E03.9 HYPOTHYROIDISM, UNSPECIFIED TYPE: Primary | ICD-10-CM

## 2025-05-23 RX ORDER — LEVOTHYROXINE SODIUM 100 UG/1
100 TABLET ORAL DAILY
Qty: 7 TABLET | Refills: 0 | Status: SHIPPED | OUTPATIENT
Start: 2025-05-23 | End: 2025-05-30

## 2025-05-23 NOTE — TELEPHONE ENCOUNTER
Reason for call:   [x] Refill   [] Prior Auth  [x] Other: Mail order is going to try and ship out her script today, patient will need a short supply send over to her local pharmacy.     Office:   [x] PCP/Provider - Shanda Hernandez DO   [] Specialty/Provider -     Medication: levothyroxine 100 mcg tablet    Dose/Frequency: 100 mcg    Quantity: 7    [facility] [address]   Does the patient have enough for 3 days?   [] Yes   [x] No - Send as HP to POD    Mail Away Pharmacy   Does the patient have enough for 10 days?   [] Yes   [x] No - Send as HP to POD

## 2025-07-23 DIAGNOSIS — E03.9 HYPOTHYROIDISM, UNSPECIFIED TYPE: ICD-10-CM

## 2025-07-26 RX ORDER — LEVOTHYROXINE SODIUM 100 UG/1
100 TABLET ORAL DAILY
Qty: 7 TABLET | Refills: 0 | OUTPATIENT
Start: 2025-07-26 | End: 2025-08-02